# Patient Record
Sex: FEMALE | Race: WHITE | NOT HISPANIC OR LATINO | URBAN - METROPOLITAN AREA
[De-identification: names, ages, dates, MRNs, and addresses within clinical notes are randomized per-mention and may not be internally consistent; named-entity substitution may affect disease eponyms.]

---

## 2017-01-06 ENCOUNTER — APPOINTMENT (RX ONLY)
Dept: URBAN - METROPOLITAN AREA CLINIC 70 | Facility: CLINIC | Age: 27
Setting detail: DERMATOLOGY
End: 2017-01-06

## 2017-01-06 ENCOUNTER — APPOINTMENT (RX ONLY)
Dept: URBAN - METROPOLITAN AREA CLINIC 71 | Facility: CLINIC | Age: 27
Setting detail: DERMATOLOGY
End: 2017-01-06

## 2017-01-06 DIAGNOSIS — L40.0 PSORIASIS VULGARIS: ICD-10-CM

## 2017-01-06 DIAGNOSIS — L85.3 XEROSIS CUTIS: ICD-10-CM

## 2017-01-06 DIAGNOSIS — L65.9 NONSCARRING HAIR LOSS, UNSPECIFIED: ICD-10-CM

## 2017-01-06 DIAGNOSIS — L29.8 OTHER PRURITUS: ICD-10-CM

## 2017-01-06 DIAGNOSIS — L29.89 OTHER PRURITUS: ICD-10-CM

## 2017-01-06 PROBLEM — D48.5 NEOPLASM OF UNCERTAIN BEHAVIOR OF SKIN: Status: ACTIVE | Noted: 2017-01-06

## 2017-01-06 PROCEDURE — ? COUNSELING

## 2017-01-06 PROCEDURE — 99213 OFFICE O/P EST LOW 20 MIN: CPT

## 2017-01-06 PROCEDURE — ? TREATMENT REGIMEN

## 2017-01-06 ASSESSMENT — LOCATION SIMPLE DESCRIPTION DERM
LOCATION SIMPLE: HAIR
LOCATION SIMPLE: HAIR
LOCATION SIMPLE: SCALP
LOCATION SIMPLE: SCALP

## 2017-01-06 ASSESSMENT — LOCATION DETAILED DESCRIPTION DERM
LOCATION DETAILED: HAIR
LOCATION DETAILED: HAIR
LOCATION DETAILED: LEFT SUPERIOR PARIETAL SCALP
LOCATION DETAILED: LEFT SUPERIOR PARIETAL SCALP

## 2017-01-06 ASSESSMENT — LOCATION ZONE DERM
LOCATION ZONE: SCALP
LOCATION ZONE: SCALP

## 2017-01-06 NOTE — PROCEDURE: TREATMENT REGIMEN
Continue Regimen: Initiate PA for Xtract, restart treatments\\nTopical steroids\\n
Action 4: Continue
Detail Level: Simple
Plan: Reviewed etiology and treatment options. Difficult problem. Persistent scalp psoriasis. Pt has done well with Xtract and ILK in the past. Discussed ladder approach therapy as well. Await Xtract approval, restart treatments. Pt has done well with this in the past. Discussed ILK, I would hold on this for now. I would like to minimize potential for any steroid atrophy. I would expect Xtract approval within the week, from my standpoint we could get started back on treatments.
Plan: Due to inflammation from psoriasis. Start xtract for psoriasis treatment.

## 2017-01-06 NOTE — PROCEDURE: TREATMENT REGIMEN
Action 3: Continue
Plan: Due to inflammation from psoriasis. Start xtract for psoriasis treatment.
Plan: Reviewed etiology and treatment options. Difficult problem. Persistent scalp psoriasis. Pt has done well with Xtract and ILK in the past. Discussed ladder approach therapy as well. Await Xtract approval, restart treatments. Pt has done well with this in the past. Discussed ILK, I would hold on this for now. I would like to minimize potential for any steroid atrophy. I would expect Xtract approval within the week, from my standpoint we could get started back on treatments.
Detail Level: Simple
Continue Regimen: Initiate PA for Xtract, restart treatments\\nTopical steroids\\n

## 2017-03-08 ENCOUNTER — APPOINTMENT (RX ONLY)
Dept: URBAN - METROPOLITAN AREA CLINIC 70 | Facility: CLINIC | Age: 27
Setting detail: DERMATOLOGY
End: 2017-03-08

## 2017-03-08 ENCOUNTER — APPOINTMENT (RX ONLY)
Dept: URBAN - METROPOLITAN AREA CLINIC 71 | Facility: CLINIC | Age: 27
Setting detail: DERMATOLOGY
End: 2017-03-08

## 2017-03-08 DIAGNOSIS — L40.0 PSORIASIS VULGARIS: ICD-10-CM

## 2017-03-08 PROCEDURE — ? XTRAC LASER

## 2017-03-08 PROCEDURE — 96920 EXCIMER LSR PSRIASIS<250SQCM: CPT

## 2017-03-08 ASSESSMENT — LOCATION DETAILED DESCRIPTION DERM
LOCATION DETAILED: LEFT ELBOW
LOCATION DETAILED: LEFT ELBOW
LOCATION DETAILED: MID-OCCIPITAL SCALP
LOCATION DETAILED: LEFT SUPERIOR PARIETAL SCALP
LOCATION DETAILED: LEFT SUPERIOR PARIETAL SCALP
LOCATION DETAILED: MID-OCCIPITAL SCALP

## 2017-03-08 ASSESSMENT — LOCATION SIMPLE DESCRIPTION DERM
LOCATION SIMPLE: LEFT ELBOW
LOCATION SIMPLE: SCALP
LOCATION SIMPLE: POSTERIOR SCALP
LOCATION SIMPLE: SCALP
LOCATION SIMPLE: LEFT ELBOW
LOCATION SIMPLE: POSTERIOR SCALP

## 2017-03-08 ASSESSMENT — LOCATION ZONE DERM
LOCATION ZONE: ARM
LOCATION ZONE: ARM
LOCATION ZONE: SCALP
LOCATION ZONE: SCALP

## 2017-03-08 NOTE — PROCEDURE: XTRAC LASER
Location #1: Scalp
Consent: Written consent obtained, risks reviewed including but not limited to crusting, scabbing, blistering, scarring, darker or lighter pigmentary change, incidental hair removal, bruising, and/or incomplete removal.
Treatment Endpoint: no erythema
Post-Care Instructions: I reviewed with the patient in detail post-care instructions. Patient should stay away from the sun and wear sun protection until treated areas are fully healed.
Treatment Number: 1
Dose Setting #1 (Mj/Cm2): 400
Total Energy In Joules: 89.60
Detail Level: Detailed
Total Pulses (Will Not Render If 0): 0
Location #2: Elbow
Total Square Area In Cm2 (Required For Proper Billing): 224

## 2017-03-08 NOTE — PROCEDURE: XTRAC LASER
Dose Setting #1 (Mj/Cm2): 400
Detail Level: Detailed
Location #1: Scalp
Location #2: Elbow
Consent: Written consent obtained, risks reviewed including but not limited to crusting, scabbing, blistering, scarring, darker or lighter pigmentary change, incidental hair removal, bruising, and/or incomplete removal.
Post-Care Instructions: I reviewed with the patient in detail post-care instructions. Patient should stay away from the sun and wear sun protection until treated areas are fully healed.
Total Energy In Joules: 89.60
Treatment Endpoint: no erythema
Total Square Area In Cm2 (Required For Proper Billing): 224
Treatment Number: 1
Total Pulses (Will Not Render If 0): 0

## 2017-03-14 ENCOUNTER — APPOINTMENT (RX ONLY)
Dept: URBAN - METROPOLITAN AREA CLINIC 71 | Facility: CLINIC | Age: 27
Setting detail: DERMATOLOGY
End: 2017-03-14

## 2017-03-14 ENCOUNTER — APPOINTMENT (RX ONLY)
Dept: URBAN - METROPOLITAN AREA CLINIC 70 | Facility: CLINIC | Age: 27
Setting detail: DERMATOLOGY
End: 2017-03-14

## 2017-03-14 DIAGNOSIS — L40.0 PSORIASIS VULGARIS: ICD-10-CM

## 2017-03-14 PROBLEM — D48.5 NEOPLASM OF UNCERTAIN BEHAVIOR OF SKIN: Status: ACTIVE | Noted: 2017-03-14

## 2017-03-14 PROCEDURE — ? XTRAC LASER

## 2017-03-14 PROCEDURE — 96920 EXCIMER LSR PSRIASIS<250SQCM: CPT

## 2017-03-14 PROCEDURE — 99211 OFF/OP EST MAY X REQ PHY/QHP: CPT | Mod: 25

## 2017-03-14 ASSESSMENT — LOCATION DETAILED DESCRIPTION DERM
LOCATION DETAILED: LEFT ELBOW
LOCATION DETAILED: LEFT SUPERIOR PARIETAL SCALP
LOCATION DETAILED: LEFT ELBOW
LOCATION DETAILED: MID-OCCIPITAL SCALP
LOCATION DETAILED: LEFT SUPERIOR PARIETAL SCALP
LOCATION DETAILED: MID-OCCIPITAL SCALP

## 2017-03-14 ASSESSMENT — LOCATION SIMPLE DESCRIPTION DERM
LOCATION SIMPLE: SCALP
LOCATION SIMPLE: LEFT ELBOW
LOCATION SIMPLE: LEFT ELBOW
LOCATION SIMPLE: POSTERIOR SCALP
LOCATION SIMPLE: SCALP
LOCATION SIMPLE: POSTERIOR SCALP

## 2017-03-14 ASSESSMENT — LOCATION ZONE DERM
LOCATION ZONE: SCALP
LOCATION ZONE: ARM
LOCATION ZONE: SCALP
LOCATION ZONE: ARM

## 2017-03-14 NOTE — PROCEDURE: XTRAC LASER
Dose Setting #1 (Mj/Cm2): 420
Total Pulses (Will Not Render If 0): 0
Consent: Written consent obtained, risks reviewed including but not limited to crusting, scabbing, blistering, scarring, darker or lighter pigmentary change, incidental hair removal, bruising, and/or incomplete removal.
Location #1: Scalp
Detail Level: Detailed
Treatment Number: 2
Post-Care Instructions: I reviewed with the patient in detail post-care instructions. Patient should stay away from the sun and wear sun protection until treated areas are fully healed.
Total Square Area In Cm2 (Required For Proper Billing): 228
Total Energy In Joules: 95.76
Treatment Endpoint: no erythema
Location #2: Elbow
Comments: Increased by 5%

## 2017-03-14 NOTE — PROCEDURE: XTRAC LASER
Detail Level: Detailed
Consent: Written consent obtained, risks reviewed including but not limited to crusting, scabbing, blistering, scarring, darker or lighter pigmentary change, incidental hair removal, bruising, and/or incomplete removal.
Dose Setting #1 (Mj/Cm2): 420
Location #1: Scalp
Comments: Increased by 5%
Post-Care Instructions: I reviewed with the patient in detail post-care instructions. Patient should stay away from the sun and wear sun protection until treated areas are fully healed.
Total Energy In Joules: 95.76
Treatment Endpoint: no erythema
Total Square Area In Cm2 (Required For Proper Billing): 228
Location #2: Elbow
Total Pulses (Will Not Render If 0): 0
Treatment Number: 2

## 2017-03-17 ENCOUNTER — APPOINTMENT (RX ONLY)
Dept: URBAN - METROPOLITAN AREA CLINIC 71 | Facility: CLINIC | Age: 27
Setting detail: DERMATOLOGY
End: 2017-03-17

## 2017-03-17 ENCOUNTER — APPOINTMENT (RX ONLY)
Dept: URBAN - METROPOLITAN AREA CLINIC 70 | Facility: CLINIC | Age: 27
Setting detail: DERMATOLOGY
End: 2017-03-17

## 2017-03-17 DIAGNOSIS — L40.0 PSORIASIS VULGARIS: ICD-10-CM

## 2017-03-17 PROBLEM — D48.5 NEOPLASM OF UNCERTAIN BEHAVIOR OF SKIN: Status: ACTIVE | Noted: 2017-03-17

## 2017-03-17 PROCEDURE — 96920 EXCIMER LSR PSRIASIS<250SQCM: CPT

## 2017-03-17 PROCEDURE — ? XTRAC LASER

## 2017-03-17 ASSESSMENT — LOCATION SIMPLE DESCRIPTION DERM
LOCATION SIMPLE: SCALP
LOCATION SIMPLE: LEFT ELBOW
LOCATION SIMPLE: POSTERIOR SCALP
LOCATION SIMPLE: LEFT ELBOW
LOCATION SIMPLE: POSTERIOR SCALP
LOCATION SIMPLE: SCALP

## 2017-03-17 ASSESSMENT — LOCATION ZONE DERM
LOCATION ZONE: ARM
LOCATION ZONE: SCALP
LOCATION ZONE: SCALP
LOCATION ZONE: ARM

## 2017-03-17 NOTE — PROCEDURE: XTRAC LASER
Location #2: Elbow
Treatment Endpoint: no erythema
Dose Setting #2 (Mj/Cm2): 483
Consent: Written consent obtained, risks reviewed including but not limited to crusting, scabbing, blistering, scarring, darker or lighter pigmentary change, incidental hair removal, bruising, and/or incomplete removal.
Treatment Number: 3
Total Pulses (Will Not Render If 0): 0
Total Energy In Joules: 79.21
Post-Care Instructions: I reviewed with the patient in detail post-care instructions. Patient should stay away from the sun and wear sun protection until treated areas are fully healed.
Comments: Increased pt by 15%. MA
Detail Level: Detailed
Location #1: Scalp
Total Square Area In Cm2 (Required For Proper Billing): 164

## 2017-03-27 ENCOUNTER — APPOINTMENT (RX ONLY)
Dept: URBAN - METROPOLITAN AREA CLINIC 70 | Facility: CLINIC | Age: 27
Setting detail: DERMATOLOGY
End: 2017-03-27

## 2017-03-27 ENCOUNTER — APPOINTMENT (RX ONLY)
Dept: URBAN - METROPOLITAN AREA CLINIC 71 | Facility: CLINIC | Age: 27
Setting detail: DERMATOLOGY
End: 2017-03-27

## 2017-03-27 DIAGNOSIS — L40.0 PSORIASIS VULGARIS: ICD-10-CM

## 2017-03-27 PROBLEM — D48.5 NEOPLASM OF UNCERTAIN BEHAVIOR OF SKIN: Status: ACTIVE | Noted: 2017-03-27

## 2017-03-27 PROCEDURE — ? XTRAC LASER

## 2017-03-27 PROCEDURE — 99211 OFF/OP EST MAY X REQ PHY/QHP: CPT | Mod: 25

## 2017-03-27 PROCEDURE — 96920 EXCIMER LSR PSRIASIS<250SQCM: CPT

## 2017-03-27 ASSESSMENT — LOCATION SIMPLE DESCRIPTION DERM
LOCATION SIMPLE: SCALP
LOCATION SIMPLE: SCALP
LOCATION SIMPLE: POSTERIOR SCALP
LOCATION SIMPLE: LEFT ELBOW
LOCATION SIMPLE: LEFT ELBOW
LOCATION SIMPLE: POSTERIOR SCALP

## 2017-03-27 ASSESSMENT — LOCATION DETAILED DESCRIPTION DERM
LOCATION DETAILED: LEFT SUPERIOR PARIETAL SCALP
LOCATION DETAILED: LEFT ELBOW
LOCATION DETAILED: MID-OCCIPITAL SCALP
LOCATION DETAILED: LEFT SUPERIOR PARIETAL SCALP
LOCATION DETAILED: MID-OCCIPITAL SCALP
LOCATION DETAILED: LEFT ELBOW

## 2017-03-27 ASSESSMENT — LOCATION ZONE DERM
LOCATION ZONE: ARM
LOCATION ZONE: SCALP
LOCATION ZONE: SCALP
LOCATION ZONE: ARM

## 2017-03-27 NOTE — PROCEDURE: XTRAC LASER
Location #1: Scalp
Post-Care Instructions: I reviewed with the patient in detail post-care instructions. Patient should stay away from the sun and wear sun protection until treated areas are fully healed.
Total Energy In Joules: 77.06
Treatment Endpoint: no erythema
Dose Setting #2 (Mj/Cm2): 500
Consent: Written consent obtained, risks reviewed including but not limited to crusting, scabbing, blistering, scarring, darker or lighter pigmentary change, incidental hair removal, bruising, and/or incomplete removal.
Comments: Increased pt by 5%. MA
Treatment Number: 4
Location #2: Elbow
Detail Level: Detailed
Total Square Area In Cm2 (Required For Proper Billing): 152
Total Pulses (Will Not Render If 0): 0

## 2017-03-27 NOTE — PROCEDURE: XTRAC LASER
Total Pulses (Will Not Render If 0): 0
Treatment Number: 4
Location #2: Elbow
Comments: Increased pt by 5%. MA
Location #1: Scalp
Dose Setting #2 (Mj/Cm2): 500
Post-Care Instructions: I reviewed with the patient in detail post-care instructions. Patient should stay away from the sun and wear sun protection until treated areas are fully healed.
Detail Level: Detailed
Treatment Endpoint: no erythema
Consent: Written consent obtained, risks reviewed including but not limited to crusting, scabbing, blistering, scarring, darker or lighter pigmentary change, incidental hair removal, bruising, and/or incomplete removal.
Total Energy In Joules: 77.06
Total Square Area In Cm2 (Required For Proper Billing): 152

## 2017-03-30 ENCOUNTER — APPOINTMENT (RX ONLY)
Dept: URBAN - METROPOLITAN AREA CLINIC 71 | Facility: CLINIC | Age: 27
Setting detail: DERMATOLOGY
End: 2017-03-30

## 2017-03-30 ENCOUNTER — APPOINTMENT (RX ONLY)
Dept: URBAN - METROPOLITAN AREA CLINIC 70 | Facility: CLINIC | Age: 27
Setting detail: DERMATOLOGY
End: 2017-03-30

## 2017-03-30 DIAGNOSIS — L40.0 PSORIASIS VULGARIS: ICD-10-CM

## 2017-03-30 PROBLEM — D48.5 NEOPLASM OF UNCERTAIN BEHAVIOR OF SKIN: Status: ACTIVE | Noted: 2017-03-30

## 2017-03-30 PROCEDURE — ? XTRAC LASER

## 2017-03-30 PROCEDURE — 96920 EXCIMER LSR PSRIASIS<250SQCM: CPT

## 2017-03-30 PROCEDURE — 99211 OFF/OP EST MAY X REQ PHY/QHP: CPT | Mod: 25

## 2017-03-30 ASSESSMENT — LOCATION DETAILED DESCRIPTION DERM
LOCATION DETAILED: MID-OCCIPITAL SCALP
LOCATION DETAILED: MID-OCCIPITAL SCALP
LOCATION DETAILED: LEFT ELBOW
LOCATION DETAILED: LEFT ELBOW
LOCATION DETAILED: LEFT SUPERIOR PARIETAL SCALP
LOCATION DETAILED: LEFT SUPERIOR PARIETAL SCALP

## 2017-03-30 ASSESSMENT — LOCATION SIMPLE DESCRIPTION DERM
LOCATION SIMPLE: SCALP
LOCATION SIMPLE: LEFT ELBOW
LOCATION SIMPLE: POSTERIOR SCALP
LOCATION SIMPLE: SCALP
LOCATION SIMPLE: POSTERIOR SCALP
LOCATION SIMPLE: LEFT ELBOW

## 2017-03-30 ASSESSMENT — LOCATION ZONE DERM
LOCATION ZONE: ARM
LOCATION ZONE: SCALP
LOCATION ZONE: SCALP
LOCATION ZONE: ARM

## 2017-03-30 NOTE — PROCEDURE: XTRAC LASER
Total Energy In Joules: 109.37
Total Square Area In Cm2 (Required For Proper Billing): 196
Location #1: Scalp
Location #2: Elbow
Detail Level: Detailed
Consent: Written consent obtained, risks reviewed including but not limited to crusting, scabbing, blistering, scarring, darker or lighter pigmentary change, incidental hair removal, bruising, and/or incomplete removal.
Comments: Increased pt by 5%. MA
Total Pulses (Will Not Render If 0): 0
Dose Setting #2 (Mj/Cm2): 880
Post-Care Instructions: I reviewed with the patient in detail post-care instructions. Patient should stay away from the sun and wear sun protection until treated areas are fully healed.
Treatment Number: 5
Treatment Endpoint: no erythema

## 2017-03-30 NOTE — PROCEDURE: XTRAC LASER
Dose Setting #2 (Mj/Cm2): 936
Treatment Number: 5
Total Pulses (Will Not Render If 0): 0
Consent: Written consent obtained, risks reviewed including but not limited to crusting, scabbing, blistering, scarring, darker or lighter pigmentary change, incidental hair removal, bruising, and/or incomplete removal.
Treatment Endpoint: no erythema
Post-Care Instructions: I reviewed with the patient in detail post-care instructions. Patient should stay away from the sun and wear sun protection until treated areas are fully healed.
Comments: Increased pt by 5%. MA
Detail Level: Detailed
Total Energy In Joules: 109.37
Location #1: Scalp
Total Square Area In Cm2 (Required For Proper Billing): 196
Location #2: Elbow

## 2017-04-03 ENCOUNTER — APPOINTMENT (RX ONLY)
Dept: URBAN - METROPOLITAN AREA CLINIC 70 | Facility: CLINIC | Age: 27
Setting detail: DERMATOLOGY
End: 2017-04-03

## 2017-04-03 ENCOUNTER — APPOINTMENT (RX ONLY)
Dept: URBAN - METROPOLITAN AREA CLINIC 71 | Facility: CLINIC | Age: 27
Setting detail: DERMATOLOGY
End: 2017-04-03

## 2017-04-03 DIAGNOSIS — L40.0 PSORIASIS VULGARIS: ICD-10-CM

## 2017-04-03 PROBLEM — L29.8 OTHER PRURITUS: Status: ACTIVE | Noted: 2017-04-03

## 2017-04-03 PROCEDURE — 96920 EXCIMER LSR PSRIASIS<250SQCM: CPT

## 2017-04-03 PROCEDURE — ? XTRAC LASER

## 2017-04-03 PROCEDURE — 99211 OFF/OP EST MAY X REQ PHY/QHP: CPT | Mod: 25

## 2017-04-03 ASSESSMENT — LOCATION DETAILED DESCRIPTION DERM
LOCATION DETAILED: LEFT OCCIPITAL SCALP
LOCATION DETAILED: LEFT ELBOW
LOCATION DETAILED: LEFT ELBOW
LOCATION DETAILED: LEFT OCCIPITAL SCALP

## 2017-04-03 ASSESSMENT — LOCATION SIMPLE DESCRIPTION DERM
LOCATION SIMPLE: LEFT ELBOW
LOCATION SIMPLE: POSTERIOR SCALP
LOCATION SIMPLE: POSTERIOR SCALP
LOCATION SIMPLE: LEFT ELBOW

## 2017-04-03 ASSESSMENT — LOCATION ZONE DERM
LOCATION ZONE: SCALP
LOCATION ZONE: SCALP
LOCATION ZONE: ARM
LOCATION ZONE: ARM

## 2017-04-03 NOTE — PROCEDURE: XTRAC LASER
Post-Care Instructions: I reviewed with the patient in detail post-care instructions. Patient should stay away from the sun and wear sun protection until treated areas are fully healed.
Total Energy In Joules: 115.38
Dose Setting #1 (Mj/Cm2): 645
Total Square Area In Cm2 (Required For Proper Billing): 180
Total Pulses (Will Not Render If 0): 0
Treatment Endpoint: no erythema
Location #2: Elbow
Consent: Written consent obtained, risks reviewed including but not limited to crusting, scabbing, blistering, scarring, darker or lighter pigmentary change, incidental hair removal, bruising, and/or incomplete removal.
Treatment Number: 6
Detail Level: Zone
Location #1: Scalp

## 2017-04-03 NOTE — PROCEDURE: XTRAC LASER
Post-Care Instructions: I reviewed with the patient in detail post-care instructions. Patient should stay away from the sun and wear sun protection until treated areas are fully healed.
Total Energy In Joules: 115.38
Total Pulses (Will Not Render If 0): 0
Dose Setting #1 (Mj/Cm2): 642
Total Square Area In Cm2 (Required For Proper Billing): 180
Location #2: Elbow
Treatment Number: 6
Consent: Written consent obtained, risks reviewed including but not limited to crusting, scabbing, blistering, scarring, darker or lighter pigmentary change, incidental hair removal, bruising, and/or incomplete removal.
Location #1: Scalp
Treatment Endpoint: no erythema
Detail Level: Zone

## 2017-04-07 ENCOUNTER — APPOINTMENT (RX ONLY)
Dept: URBAN - METROPOLITAN AREA CLINIC 70 | Facility: CLINIC | Age: 27
Setting detail: DERMATOLOGY
End: 2017-04-07

## 2017-04-07 ENCOUNTER — APPOINTMENT (RX ONLY)
Dept: URBAN - METROPOLITAN AREA CLINIC 71 | Facility: CLINIC | Age: 27
Setting detail: DERMATOLOGY
End: 2017-04-07

## 2017-04-07 DIAGNOSIS — L40.0 PSORIASIS VULGARIS: ICD-10-CM

## 2017-04-07 PROBLEM — D48.5 NEOPLASM OF UNCERTAIN BEHAVIOR OF SKIN: Status: ACTIVE | Noted: 2017-04-07

## 2017-04-07 PROCEDURE — 96920 EXCIMER LSR PSRIASIS<250SQCM: CPT

## 2017-04-07 PROCEDURE — ? XTRAC LASER

## 2017-04-07 ASSESSMENT — LOCATION SIMPLE DESCRIPTION DERM
LOCATION SIMPLE: POSTERIOR SCALP
LOCATION SIMPLE: POSTERIOR SCALP
LOCATION SIMPLE: LEFT ELBOW
LOCATION SIMPLE: LEFT ELBOW

## 2017-04-07 ASSESSMENT — LOCATION DETAILED DESCRIPTION DERM
LOCATION DETAILED: LEFT OCCIPITAL SCALP
LOCATION DETAILED: LEFT OCCIPITAL SCALP
LOCATION DETAILED: LEFT ELBOW
LOCATION DETAILED: LEFT ELBOW

## 2017-04-07 ASSESSMENT — LOCATION ZONE DERM
LOCATION ZONE: ARM
LOCATION ZONE: SCALP
LOCATION ZONE: ARM
LOCATION ZONE: SCALP

## 2017-04-07 NOTE — PROCEDURE: XTRAC LASER
Dose Setting #1 (Mj/Cm2): 737
Treatment Number: 6
Total Energy In Joules: 173.93
Detail Level: Simple
Total Pulses (Will Not Render If 0): 0
Consent: Written consent obtained, risks reviewed including but not limited to crusting, scabbing, blistering, scarring, darker or lighter pigmentary change, incidental hair removal, bruising, and/or incomplete removal.
Post-Care Instructions: I reviewed with the patient in detail post-care instructions. Patient should stay away from the sun and wear sun protection until treated areas are fully healed.
Location #1: Scalp
Treatment Endpoint: no erythema
Location #2: Elbow
Total Square Area In Cm2 (Required For Proper Billing): 236

## 2017-04-07 NOTE — PROCEDURE: XTRAC LASER
Treatment Endpoint: no erythema
Detail Level: Simple
Dose Setting #1 (Mj/Cm2): 737
Consent: Written consent obtained, risks reviewed including but not limited to crusting, scabbing, blistering, scarring, darker or lighter pigmentary change, incidental hair removal, bruising, and/or incomplete removal.
Treatment Number: 6
Location #2: Elbow
Post-Care Instructions: I reviewed with the patient in detail post-care instructions. Patient should stay away from the sun and wear sun protection until treated areas are fully healed.
Total Square Area In Cm2 (Required For Proper Billing): 236
Location #1: Scalp
Total Energy In Joules: 173.93
Total Pulses (Will Not Render If 0): 0

## 2017-04-14 ENCOUNTER — APPOINTMENT (RX ONLY)
Dept: URBAN - METROPOLITAN AREA CLINIC 70 | Facility: CLINIC | Age: 27
Setting detail: DERMATOLOGY
End: 2017-04-14

## 2017-04-14 ENCOUNTER — APPOINTMENT (RX ONLY)
Dept: URBAN - METROPOLITAN AREA CLINIC 71 | Facility: CLINIC | Age: 27
Setting detail: DERMATOLOGY
End: 2017-04-14

## 2017-04-14 DIAGNOSIS — L40.0 PSORIASIS VULGARIS: ICD-10-CM

## 2017-04-14 PROCEDURE — 99211 OFF/OP EST MAY X REQ PHY/QHP: CPT | Mod: 25

## 2017-04-14 PROCEDURE — 96920 EXCIMER LSR PSRIASIS<250SQCM: CPT

## 2017-04-14 PROCEDURE — ? XTRAC LASER

## 2017-04-14 ASSESSMENT — LOCATION ZONE DERM
LOCATION ZONE: SCALP
LOCATION ZONE: ARM
LOCATION ZONE: ARM
LOCATION ZONE: SCALP

## 2017-04-14 NOTE — PROCEDURE: XTRAC LASER
Location #2: Elbow
Dose Setting #2 (Mj/Cm2): 0
Consent: Written consent obtained, risks reviewed including but not limited to crusting, scabbing, blistering, scarring, darker or lighter pigmentary change, incidental hair removal, bruising, and/or incomplete removal.
Treatment Number: 8
Total Energy In Joules: 126.76
Detail Level: Simple
Post-Care Instructions: I reviewed with the patient in detail post-care instructions. Patient should stay away from the sun and wear sun protection until treated areas are fully healed.
Treatment Endpoint: no erythema
Comments: Pt had engagement photos and did not want to be red, so declined doing elbow. Maintained dose for scalp./my
Location #1: Scalp
Dose Setting #1 (Mj/Cm2): 737
Total Square Area In Cm2 (Required For Proper Billing): 172

## 2017-04-14 NOTE — PROCEDURE: XTRAC LASER
Treatment Endpoint: no erythema
Treatment Number: 8
Location #1: Scalp
Total Energy In Joules: 126.76
Total Square Area In Cm2 (Required For Proper Billing): 172
Dose Setting #1 (Mj/Cm2): 737
Dose Setting #2 (Mj/Cm2): 0
Detail Level: Simple
Post-Care Instructions: I reviewed with the patient in detail post-care instructions. Patient should stay away from the sun and wear sun protection until treated areas are fully healed.
Location #2: Elbow
Comments: Pt had engagement photos and did not want to be red, so declined doing elbow. Maintained dose for scalp./my
Consent: Written consent obtained, risks reviewed including but not limited to crusting, scabbing, blistering, scarring, darker or lighter pigmentary change, incidental hair removal, bruising, and/or incomplete removal.

## 2017-04-17 ENCOUNTER — APPOINTMENT (RX ONLY)
Dept: URBAN - METROPOLITAN AREA CLINIC 71 | Facility: CLINIC | Age: 27
Setting detail: DERMATOLOGY
End: 2017-04-17

## 2017-04-17 ENCOUNTER — APPOINTMENT (RX ONLY)
Dept: URBAN - METROPOLITAN AREA CLINIC 70 | Facility: CLINIC | Age: 27
Setting detail: DERMATOLOGY
End: 2017-04-17

## 2017-04-17 DIAGNOSIS — L40.0 PSORIASIS VULGARIS: ICD-10-CM

## 2017-04-17 PROBLEM — L29.8 OTHER PRURITUS: Status: ACTIVE | Noted: 2017-04-17

## 2017-04-17 PROCEDURE — 96920 EXCIMER LSR PSRIASIS<250SQCM: CPT

## 2017-04-17 PROCEDURE — 99211 OFF/OP EST MAY X REQ PHY/QHP: CPT | Mod: 25

## 2017-04-17 PROCEDURE — ? XTRAC LASER

## 2017-04-17 ASSESSMENT — LOCATION ZONE DERM
LOCATION ZONE: ARM
LOCATION ZONE: SCALP
LOCATION ZONE: ARM
LOCATION ZONE: SCALP

## 2017-04-17 ASSESSMENT — LOCATION DETAILED DESCRIPTION DERM
LOCATION DETAILED: LEFT ELBOW
LOCATION DETAILED: LEFT OCCIPITAL SCALP
LOCATION DETAILED: LEFT OCCIPITAL SCALP
LOCATION DETAILED: LEFT ELBOW

## 2017-04-17 ASSESSMENT — LOCATION SIMPLE DESCRIPTION DERM
LOCATION SIMPLE: LEFT ELBOW
LOCATION SIMPLE: LEFT ELBOW
LOCATION SIMPLE: POSTERIOR SCALP
LOCATION SIMPLE: POSTERIOR SCALP

## 2017-04-17 NOTE — PROCEDURE: XTRAC LASER
Location #1: Scalp
Consent: Written consent obtained, risks reviewed including but not limited to crusting, scabbing, blistering, scarring, darker or lighter pigmentary change, incidental hair removal, bruising, and/or incomplete removal.
Detail Level: Simple
Total Square Area In Cm2 (Required For Proper Billing): 220
Treatment Endpoint: no erythema
Total Energy In Joules: 160.81
Treatment Number: 9
Location #2: Elbow
Dose Setting #1 (Mj/Cm2): 737
Total Pulses (Will Not Render If 0): 0
Dose Setting #2 (Mj/Cm2): 725
Post-Care Instructions: I reviewed with the patient in detail post-care instructions. Patient should stay away from the sun and wear sun protection until treated areas are fully healed.
Comments: Pt requested to treat elbow today, but with a decrease of 15 %. Maintained dose on scalp.RIGO
% Increase/Decrease From Last Treatment: Decreased by 15% on elbow only, per pt request.DP

## 2017-04-17 NOTE — PROCEDURE: XTRAC LASER
Total Square Area In Cm2 (Required For Proper Billing): 220
Total Energy In Joules: 160.81
Location #2: Elbow
Dose Setting #2 (Mj/Cm2): 063
Comments: Pt requested to treat elbow today, but with a decrease of 15 %. Maintained dose on scalp.ISSA
Total Pulses (Will Not Render If 0): 0
Consent: Written consent obtained, risks reviewed including but not limited to crusting, scabbing, blistering, scarring, darker or lighter pigmentary change, incidental hair removal, bruising, and/or incomplete removal.
% Increase/Decrease From Last Treatment: Decreased by 15% on elbow only, per pt request.DP
Post-Care Instructions: I reviewed with the patient in detail post-care instructions. Patient should stay away from the sun and wear sun protection until treated areas are fully healed.
Detail Level: Simple
Treatment Endpoint: no erythema
Dose Setting #1 (Mj/Cm2): 737
Location #1: Scalp
Treatment Number: 9

## 2017-04-20 ENCOUNTER — APPOINTMENT (RX ONLY)
Dept: URBAN - METROPOLITAN AREA CLINIC 70 | Facility: CLINIC | Age: 27
Setting detail: DERMATOLOGY
End: 2017-04-20

## 2017-04-20 ENCOUNTER — APPOINTMENT (RX ONLY)
Dept: URBAN - METROPOLITAN AREA CLINIC 71 | Facility: CLINIC | Age: 27
Setting detail: DERMATOLOGY
End: 2017-04-20

## 2017-04-20 DIAGNOSIS — L40.0 PSORIASIS VULGARIS: ICD-10-CM

## 2017-04-20 PROCEDURE — 99211 OFF/OP EST MAY X REQ PHY/QHP: CPT | Mod: 25

## 2017-04-20 PROCEDURE — ? XTRAC LASER

## 2017-04-20 PROCEDURE — 96920 EXCIMER LSR PSRIASIS<250SQCM: CPT

## 2017-04-20 ASSESSMENT — LOCATION DETAILED DESCRIPTION DERM
LOCATION DETAILED: LEFT ELBOW
LOCATION DETAILED: LEFT ELBOW
LOCATION DETAILED: LEFT OCCIPITAL SCALP
LOCATION DETAILED: LEFT OCCIPITAL SCALP

## 2017-04-20 ASSESSMENT — LOCATION ZONE DERM
LOCATION ZONE: SCALP
LOCATION ZONE: SCALP
LOCATION ZONE: ARM
LOCATION ZONE: ARM

## 2017-04-20 NOTE — PROCEDURE: XTRAC LASER
Treatment Number: 10
Dose Setting #1 (Mj/Cm2): 737
Total Pulses (Will Not Render If 0): 0
Comments: Pt declined to treat elbow today.pineda
Detail Level: Simple
Location #1: Scalp
Total Energy In Joules: 85.49
Treatment Endpoint: no erythema
Total Square Area In Cm2 (Required For Proper Billing): 116
Location #2: Elbow
% Increase/Decrease From Last Treatment: Maintained  dose on scalp per patient request .pineda
Consent: Written consent obtained, risks reviewed including but not limited to crusting, scabbing, blistering, scarring, darker or lighter pigmentary change, incidental hair removal, bruising, and/or incomplete removal.
Post-Care Instructions: I reviewed with the patient in detail post-care instructions. Patient should stay away from the sun and wear sun protection until treated areas are fully healed.

## 2017-04-20 NOTE — PROCEDURE: XTRAC LASER
Total Square Area In Cm2 (Required For Proper Billing): 116
Consent: Written consent obtained, risks reviewed including but not limited to crusting, scabbing, blistering, scarring, darker or lighter pigmentary change, incidental hair removal, bruising, and/or incomplete removal.
Treatment Number: 10
Post-Care Instructions: I reviewed with the patient in detail post-care instructions. Patient should stay away from the sun and wear sun protection until treated areas are fully healed.
Dose Setting #1 (Mj/Cm2): 737
Total Pulses (Will Not Render If 0): 0
Total Energy In Joules: 85.49
Detail Level: Simple
% Increase/Decrease From Last Treatment: Maintained  dose on scalp per patient request .sofiya
Treatment Endpoint: no erythema
Location #2: Elbow
Comments: Pt declined to treat elbow today.sofiya
Location #1: Scalp

## 2017-04-24 ENCOUNTER — APPOINTMENT (RX ONLY)
Dept: URBAN - METROPOLITAN AREA CLINIC 70 | Facility: CLINIC | Age: 27
Setting detail: DERMATOLOGY
End: 2017-04-24

## 2017-04-24 ENCOUNTER — APPOINTMENT (RX ONLY)
Dept: URBAN - METROPOLITAN AREA CLINIC 71 | Facility: CLINIC | Age: 27
Setting detail: DERMATOLOGY
End: 2017-04-24

## 2017-04-24 DIAGNOSIS — L40.0 PSORIASIS VULGARIS: ICD-10-CM

## 2017-04-24 PROBLEM — D48.5 NEOPLASM OF UNCERTAIN BEHAVIOR OF SKIN: Status: ACTIVE | Noted: 2017-04-24

## 2017-04-24 PROBLEM — L29.8 OTHER PRURITUS: Status: ACTIVE | Noted: 2017-04-24

## 2017-04-24 PROCEDURE — 96920 EXCIMER LSR PSRIASIS<250SQCM: CPT

## 2017-04-24 PROCEDURE — 99211 OFF/OP EST MAY X REQ PHY/QHP: CPT | Mod: 25

## 2017-04-24 PROCEDURE — ? XTRAC LASER

## 2017-04-24 ASSESSMENT — LOCATION SIMPLE DESCRIPTION DERM
LOCATION SIMPLE: POSTERIOR SCALP
LOCATION SIMPLE: LEFT ELBOW
LOCATION SIMPLE: POSTERIOR SCALP
LOCATION SIMPLE: LEFT ELBOW

## 2017-04-24 ASSESSMENT — LOCATION DETAILED DESCRIPTION DERM
LOCATION DETAILED: LEFT ELBOW
LOCATION DETAILED: LEFT OCCIPITAL SCALP
LOCATION DETAILED: LEFT ELBOW
LOCATION DETAILED: LEFT OCCIPITAL SCALP

## 2017-04-24 ASSESSMENT — LOCATION ZONE DERM
LOCATION ZONE: ARM
LOCATION ZONE: SCALP
LOCATION ZONE: SCALP
LOCATION ZONE: ARM

## 2017-04-24 NOTE — PROCEDURE: XTRAC LASER
Treatment Number: 11
Post-Care Instructions: I reviewed with the patient in detail post-care instructions. Patient should stay away from the sun and wear sun protection until treated areas are fully healed.
Dose Setting #1 (Mj/Cm2): 811
Detail Level: Simple
Location #2: Elbow
Total Pulses (Will Not Render If 0): 0
Location #1: Scalp
Total Square Area In Cm2 (Required For Proper Billing): 188
Consent: Written consent obtained, risks reviewed including but not limited to crusting, scabbing, blistering, scarring, darker or lighter pigmentary change, incidental hair removal, bruising, and/or incomplete removal.
Total Energy In Joules: 150.48
Comments: increased by 10% on scalp\\ndecreased by 10% on elbow.
Treatment Endpoint: no erythema
Dose Setting #2 (Mj/Cm2): 563

## 2017-04-28 ENCOUNTER — APPOINTMENT (RX ONLY)
Dept: URBAN - METROPOLITAN AREA CLINIC 70 | Facility: CLINIC | Age: 27
Setting detail: DERMATOLOGY
End: 2017-04-28

## 2017-04-28 ENCOUNTER — APPOINTMENT (RX ONLY)
Dept: URBAN - METROPOLITAN AREA CLINIC 71 | Facility: CLINIC | Age: 27
Setting detail: DERMATOLOGY
End: 2017-04-28

## 2017-04-28 DIAGNOSIS — Z71.89 OTHER SPECIFIED COUNSELING: ICD-10-CM

## 2017-04-28 DIAGNOSIS — L40.0 PSORIASIS VULGARIS: ICD-10-CM | Status: IMPROVED

## 2017-04-28 DIAGNOSIS — L40.0 PSORIASIS VULGARIS: ICD-10-CM

## 2017-04-28 PROCEDURE — 96920 EXCIMER LSR PSRIASIS<250SQCM: CPT

## 2017-04-28 PROCEDURE — 11900 INJECT SKIN LESIONS </W 7: CPT

## 2017-04-28 PROCEDURE — ? INTRALESIONAL KENALOG

## 2017-04-28 PROCEDURE — ? PRESCRIPTION

## 2017-04-28 PROCEDURE — ? XTRAC LASER

## 2017-04-28 PROCEDURE — ? TREATMENT REGIMEN

## 2017-04-28 PROCEDURE — ? COUNSELING

## 2017-04-28 PROCEDURE — 99213 OFFICE O/P EST LOW 20 MIN: CPT | Mod: 25

## 2017-04-28 RX ORDER — CLOBETASOL PROPIONATE 0.05 G/ML
SPRAY TOPICAL
Qty: 1 | Refills: 3 | Status: ERX

## 2017-04-28 RX ORDER — CALCIPOTRIENE AND BETAMETHASONE DIPROPIONATE 50; .5 UG/G; MG/G
SUSPENSION TOPICAL
Qty: 1 | Refills: 3 | Status: ERX

## 2017-04-28 ASSESSMENT — LOCATION DETAILED DESCRIPTION DERM
LOCATION DETAILED: RIGHT INFERIOR OCCIPITAL SCALP
LOCATION DETAILED: LEFT SUPERIOR PARIETAL SCALP
LOCATION DETAILED: LEFT ELBOW
LOCATION DETAILED: LEFT SUPERIOR OCCIPITAL SCALP
LOCATION DETAILED: LEFT ELBOW
LOCATION DETAILED: LEFT SUPERIOR OCCIPITAL SCALP
LOCATION DETAILED: LEFT CENTRAL PARIETAL SCALP
LOCATION DETAILED: LEFT INFERIOR OCCIPITAL SCALP
LOCATION DETAILED: LEFT CENTRAL PARIETAL SCALP
LOCATION DETAILED: LEFT SUPERIOR PARIETAL SCALP
LOCATION DETAILED: RIGHT INFERIOR OCCIPITAL SCALP
LOCATION DETAILED: LEFT OCCIPITAL SCALP
LOCATION DETAILED: LEFT OCCIPITAL SCALP
LOCATION DETAILED: RIGHT CENTRAL PARIETAL SCALP
LOCATION DETAILED: LEFT ELBOW
LOCATION DETAILED: LEFT INFERIOR OCCIPITAL SCALP
LOCATION DETAILED: RIGHT CENTRAL PARIETAL SCALP
LOCATION DETAILED: MID-OCCIPITAL SCALP
LOCATION DETAILED: LEFT ELBOW
LOCATION DETAILED: MID-OCCIPITAL SCALP

## 2017-04-28 ASSESSMENT — LOCATION SIMPLE DESCRIPTION DERM
LOCATION SIMPLE: POSTERIOR SCALP
LOCATION SIMPLE: SCALP
LOCATION SIMPLE: LEFT ELBOW
LOCATION SIMPLE: LEFT OCCIPITAL SCALP
LOCATION SIMPLE: POSTERIOR SCALP
LOCATION SIMPLE: SCALP
LOCATION SIMPLE: POSTERIOR SCALP
LOCATION SIMPLE: LEFT ELBOW
LOCATION SIMPLE: LEFT OCCIPITAL SCALP
LOCATION SIMPLE: LEFT ELBOW
LOCATION SIMPLE: POSTERIOR SCALP
LOCATION SIMPLE: LEFT ELBOW

## 2017-04-28 ASSESSMENT — LOCATION ZONE DERM
LOCATION ZONE: SCALP
LOCATION ZONE: SCALP
LOCATION ZONE: ARM
LOCATION ZONE: SCALP
LOCATION ZONE: ARM
LOCATION ZONE: SCALP

## 2017-04-28 NOTE — PROCEDURE: TREATMENT REGIMEN
Action 4: Continue
Detail Level: Zone
Continue Regimen: Taclonex sol qd\\nClobex Spray Qhs x 2 weeks, then use PRN for flares\\nEximer laser BIW, Pt has been treated with 10 of her approved 30 treatments
Plan: Reviewed etiology and treatment options. Pt has done well with Xtract, improved overall. Few areas in the scalp treated with ILK. Risks and benefits reviewed. Discussed Otezla as well. Pt would like to maintain with current regimen. Call with changes or concerns.

## 2017-04-28 NOTE — PROCEDURE: INTRALESIONAL KENALOG
Kenalog Preparation: Kenalog
Consent: The risks of atrophy were reviewed with the patient.
Administered By (Optional): MONTY
Detail Level: Detailed
Total Volume (Ccs): 0.6
Concentration Of Kenalog Solution Injected (Mg/Ml): 5.0
Include Z78.9 (Other Specified Conditions Influencing Health Status) As An Associated Diagnosis?: No
Expiration Date For Kenalog (Optional): DEC 2017
Medical Necessity Clause: This procedure was medically necessary because the lesions that were treated were:
Lot # For Kenalog (Optional): [id number]
X Size Of Lesion In Cm (Optional): 0

## 2017-04-28 NOTE — PROCEDURE: TREATMENT REGIMEN
Continue Regimen: Taclonex sol qd\\nClobex Spray Qhs x 2 weeks, then use PRN for flares\\nEximer laser BIW, Pt has been treated with 10 of her approved 30 treatments
Action 2: Continue
Plan: Reviewed etiology and treatment options. Pt has done well with Xtract, improved overall. Few areas in the scalp treated with ILK. Risks and benefits reviewed. Discussed Otezla as well. Pt would like to maintain with current regimen. Call with changes or concerns.
Detail Level: Zone

## 2017-04-28 NOTE — PROCEDURE: XTRAC LASER
% Increase/Decrease From Last Treatment: Increased by 5% per pt request
Total Energy In Joules: 103.56
Total Pulses (Will Not Render If 0): 0
Treatment Number: 12
Location #2: Elbow
Location #1: Scalp
Detail Level: Simple
Dose Setting #1 (Mj/Cm2): 854
Total Square Area In Cm2 (Required For Proper Billing): 124
Post-Care Instructions: I reviewed with the patient in detail post-care instructions. Patient should stay away from the sun and wear sun protection until treated areas are fully healed.
Dose Setting #2 (Mj/Cm2): 598
Consent: Written consent obtained, risks reviewed including but not limited to crusting, scabbing, blistering, scarring, darker or lighter pigmentary change, incidental hair removal, bruising, and/or incomplete removal.
Treatment Endpoint: no erythema

## 2017-04-28 NOTE — PROCEDURE: INTRALESIONAL KENALOG
Detail Level: Detailed
Kenalog Preparation: Kenalog
Total Volume (Ccs): 0.6
Include Z78.9 (Other Specified Conditions Influencing Health Status) As An Associated Diagnosis?: No
Consent: The risks of atrophy were reviewed with the patient.
Lot # For Kenalog (Optional): [id number]
Expiration Date For Kenalog (Optional): DEC 2017
Concentration Of Kenalog Solution Injected (Mg/Ml): 5.0
Administered By (Optional): JERRY
X Size Of Lesion In Cm (Optional): 0
Medical Necessity Clause: This procedure was medically necessary because the lesions that were treated were:

## 2017-04-28 NOTE — PROCEDURE: XTRAC LASER
Post-Care Instructions: I reviewed with the patient in detail post-care instructions. Patient should stay away from the sun and wear sun protection until treated areas are fully healed.
Total Pulses (Will Not Render If 0): 0
Location #2: Elbow
Detail Level: Simple
Location #1: Scalp
Total Square Area In Cm2 (Required For Proper Billing): 124
% Increase/Decrease From Last Treatment: Increased by 5% per pt request
Dose Setting #2 (Mj/Cm2): 593
Treatment Endpoint: no erythema
Total Energy In Joules: 103.56
Treatment Number: 12
Dose Setting #1 (Mj/Cm2): 855
Consent: Written consent obtained, risks reviewed including but not limited to crusting, scabbing, blistering, scarring, darker or lighter pigmentary change, incidental hair removal, bruising, and/or incomplete removal.

## 2017-05-08 ENCOUNTER — APPOINTMENT (RX ONLY)
Dept: URBAN - METROPOLITAN AREA CLINIC 70 | Facility: CLINIC | Age: 27
Setting detail: DERMATOLOGY
End: 2017-05-08

## 2017-05-08 ENCOUNTER — APPOINTMENT (RX ONLY)
Dept: URBAN - METROPOLITAN AREA CLINIC 71 | Facility: CLINIC | Age: 27
Setting detail: DERMATOLOGY
End: 2017-05-08

## 2017-05-08 DIAGNOSIS — L40.0 PSORIASIS VULGARIS: ICD-10-CM

## 2017-05-08 PROCEDURE — 96921 EXCIMER LSR PSRIASIS 250-500: CPT

## 2017-05-08 PROCEDURE — 99211 OFF/OP EST MAY X REQ PHY/QHP: CPT | Mod: 25

## 2017-05-08 PROCEDURE — ? XTRAC LASER

## 2017-05-08 ASSESSMENT — LOCATION ZONE DERM
LOCATION ZONE: ARM
LOCATION ZONE: SCALP
LOCATION ZONE: ARM
LOCATION ZONE: SCALP

## 2017-05-08 NOTE — PROCEDURE: XTRAC LASER
Dose Setting #1 (Mj/Cm2): 857
Location #1: Scalp
Total Square Area In Cm2 (Required For Proper Billing): 336
Dose Setting #2 (Mj/Cm2): 599
Post-Care Instructions: I reviewed with the patient in detail post-care instructions. Patient should stay away from the sun and wear sun protection until treated areas are fully healed.
Comments: Maintained dose per patient request.RIGO
Total Energy In Joules: 285.23
Detail Level: Simple
Location #2: Elbow
Total Pulses (Will Not Render If 0): 0
Consent: Written consent obtained, risks reviewed including but not limited to crusting, scabbing, blistering, scarring, darker or lighter pigmentary change, incidental hair removal, bruising, and/or incomplete removal.
Treatment Endpoint: no erythema
% Increase/Decrease From Last Treatment: Maintained dose
Treatment Number: 13

## 2017-05-08 NOTE — PROCEDURE: XTRAC LASER
Total Pulses (Will Not Render If 0): 0
Location #1: Scalp
Comments: Maintained dose per patient request.ISSA
Treatment Number: 13
Dose Setting #2 (Mj/Cm2): 590
Consent: Written consent obtained, risks reviewed including but not limited to crusting, scabbing, blistering, scarring, darker or lighter pigmentary change, incidental hair removal, bruising, and/or incomplete removal.
Total Energy In Joules: 285.23
Treatment Endpoint: no erythema
Location #2: Elbow
Detail Level: Simple
Total Square Area In Cm2 (Required For Proper Billing): 336
% Increase/Decrease From Last Treatment: Maintained dose
Dose Setting #1 (Mj/Cm2): 850
Post-Care Instructions: I reviewed with the patient in detail post-care instructions. Patient should stay away from the sun and wear sun protection until treated areas are fully healed.

## 2017-05-10 ENCOUNTER — APPOINTMENT (RX ONLY)
Dept: URBAN - METROPOLITAN AREA CLINIC 70 | Facility: CLINIC | Age: 27
Setting detail: DERMATOLOGY
End: 2017-05-10

## 2017-05-10 ENCOUNTER — APPOINTMENT (RX ONLY)
Dept: URBAN - METROPOLITAN AREA CLINIC 71 | Facility: CLINIC | Age: 27
Setting detail: DERMATOLOGY
End: 2017-05-10

## 2017-05-10 DIAGNOSIS — L40.0 PSORIASIS VULGARIS: ICD-10-CM

## 2017-05-10 PROCEDURE — ? XTRAC LASER

## 2017-05-10 PROCEDURE — 99211 OFF/OP EST MAY X REQ PHY/QHP: CPT | Mod: 25

## 2017-05-10 PROCEDURE — 96920 EXCIMER LSR PSRIASIS<250SQCM: CPT

## 2017-05-10 ASSESSMENT — LOCATION ZONE DERM
LOCATION ZONE: SCALP
LOCATION ZONE: ARM
LOCATION ZONE: SCALP
LOCATION ZONE: ARM

## 2017-05-10 NOTE — PROCEDURE: XTRAC LASER
Consent: Written consent obtained, risks reviewed including but not limited to crusting, scabbing, blistering, scarring, darker or lighter pigmentary change, incidental hair removal, bruising, and/or incomplete removal.
Location #1: Scalp
Detail Level: Simple
Total Square Area In Cm2 (Required For Proper Billing): 184
Total Energy In Joules: 154.68
Dose Setting #1 (Mj/Cm2): 851
Location #2: Elbow
Dose Setting #2 (Mj/Cm2): 594
Post-Care Instructions: I reviewed with the patient in detail post-care instructions. Patient should stay away from the sun and wear sun protection until treated areas are fully healed.
Treatment Number: 14
% Increase/Decrease From Last Treatment: Maintained dose
Comments: Maintained dose per patient request.ISSA
Treatment Endpoint: no erythema
Total Pulses (Will Not Render If 0): 0

## 2017-05-10 NOTE — PROCEDURE: XTRAC LASER
Post-Care Instructions: I reviewed with the patient in detail post-care instructions. Patient should stay away from the sun and wear sun protection until treated areas are fully healed.
Detail Level: Simple
Consent: Written consent obtained, risks reviewed including but not limited to crusting, scabbing, blistering, scarring, darker or lighter pigmentary change, incidental hair removal, bruising, and/or incomplete removal.
Total Square Area In Cm2 (Required For Proper Billing): 184
Dose Setting #1 (Mj/Cm2): 858
Comments: Maintained dose per patient request.RIGO
Treatment Endpoint: no erythema
Location #1: Scalp
Dose Setting #2 (Mj/Cm2): 599
% Increase/Decrease From Last Treatment: Maintained dose
Treatment Number: 14
Total Pulses (Will Not Render If 0): 0
Total Energy In Joules: 154.68
Location #2: Elbow

## 2017-05-15 ENCOUNTER — APPOINTMENT (RX ONLY)
Dept: URBAN - METROPOLITAN AREA CLINIC 70 | Facility: CLINIC | Age: 27
Setting detail: DERMATOLOGY
End: 2017-05-15

## 2017-05-15 ENCOUNTER — APPOINTMENT (RX ONLY)
Dept: URBAN - METROPOLITAN AREA CLINIC 71 | Facility: CLINIC | Age: 27
Setting detail: DERMATOLOGY
End: 2017-05-15

## 2017-05-15 DIAGNOSIS — L40.0 PSORIASIS VULGARIS: ICD-10-CM

## 2017-05-15 PROCEDURE — 96920 EXCIMER LSR PSRIASIS<250SQCM: CPT

## 2017-05-15 PROCEDURE — ? XTRAC LASER

## 2017-05-15 ASSESSMENT — LOCATION ZONE DERM
LOCATION ZONE: SCALP
LOCATION ZONE: SCALP
LOCATION ZONE: ARM
LOCATION ZONE: ARM

## 2017-05-15 NOTE — PROCEDURE: XTRAC LASER
Location #1: Scalp
Location #2: Elbow
% Increase/Decrease From Last Treatment: Increased dose by 10 percent per patient request.pineda
Treatment Number: 14
Post-Care Instructions: I reviewed with the patient in detail post-care instructions. Patient should stay away from the sun and wear sun protection until treated areas are fully healed.
Total Pulses (Will Not Render If 0): 0
Consent: Written consent obtained, risks reviewed including but not limited to crusting, scabbing, blistering, scarring, darker or lighter pigmentary change, incidental hair removal, bruising, and/or incomplete removal.
Total Square Area In Cm2 (Required For Proper Billing): n/a
Comments: Maintained dose per patient request.ISSA
Detail Level: Simple
Dose Setting #1 (Mj/Cm2): 940
Dose Setting #2 (Mj/Cm2): 650
Treatment Endpoint: no erythema

## 2017-05-15 NOTE — PROCEDURE: XTRAC LASER
Location #2: Elbow
Treatment Number: 14
Total Pulses (Will Not Render If 0): 0
Comments: Maintained dose per patient request.RIGO
Consent: Written consent obtained, risks reviewed including but not limited to crusting, scabbing, blistering, scarring, darker or lighter pigmentary change, incidental hair removal, bruising, and/or incomplete removal.
Dose Setting #2 (Mj/Cm2): 650
Treatment Endpoint: no erythema
Detail Level: Simple
Total Energy In Joules: n/a
Dose Setting #1 (Mj/Cm2): 940
Location #1: Scalp
% Increase/Decrease From Last Treatment: Increased dose by 10 percent per patient request.sofiya
Post-Care Instructions: I reviewed with the patient in detail post-care instructions. Patient should stay away from the sun and wear sun protection until treated areas are fully healed.

## 2017-05-19 ENCOUNTER — APPOINTMENT (RX ONLY)
Dept: URBAN - METROPOLITAN AREA CLINIC 70 | Facility: CLINIC | Age: 27
Setting detail: DERMATOLOGY
End: 2017-05-19

## 2017-05-19 ENCOUNTER — APPOINTMENT (RX ONLY)
Dept: URBAN - METROPOLITAN AREA CLINIC 71 | Facility: CLINIC | Age: 27
Setting detail: DERMATOLOGY
End: 2017-05-19

## 2017-05-19 DIAGNOSIS — L40.0 PSORIASIS VULGARIS: ICD-10-CM

## 2017-05-19 PROCEDURE — ? XTRAC LASER

## 2017-05-19 PROCEDURE — 99211 OFF/OP EST MAY X REQ PHY/QHP: CPT | Mod: 25

## 2017-05-19 PROCEDURE — 96920 EXCIMER LSR PSRIASIS<250SQCM: CPT

## 2017-05-19 ASSESSMENT — LOCATION DETAILED DESCRIPTION DERM
LOCATION DETAILED: LEFT OCCIPITAL SCALP
LOCATION DETAILED: LEFT ELBOW
LOCATION DETAILED: LEFT OCCIPITAL SCALP
LOCATION DETAILED: LEFT ELBOW

## 2017-05-19 ASSESSMENT — LOCATION ZONE DERM
LOCATION ZONE: SCALP
LOCATION ZONE: ARM
LOCATION ZONE: ARM
LOCATION ZONE: SCALP

## 2017-05-19 ASSESSMENT — LOCATION SIMPLE DESCRIPTION DERM
LOCATION SIMPLE: LEFT ELBOW
LOCATION SIMPLE: POSTERIOR SCALP
LOCATION SIMPLE: LEFT ELBOW
LOCATION SIMPLE: POSTERIOR SCALP

## 2017-05-19 NOTE — PROCEDURE: XTRAC LASER
Location #1: Scalp
Total Square Area In Cm2 (Required For Proper Billing): 267.
Dose Setting #1 (Mj/Cm2): 1040
Comments: Maintained dose per patient request.RIGO
Detail Level: Simple
% Increase/Decrease From Last Treatment: Increased dose by 10 percent  on scalp only  and maintained dose on elbow per patient request.sofiya
Post-Care Instructions: I reviewed with the patient in detail post-care instructions. Patient should stay away from the sun and wear sun protection until treated areas are fully healed.
Treatment Endpoint: no erythema
Total Pulses (Will Not Render If 0): 0
Total Energy In Joules: 3125
Location #2: Elbow
Treatment Number: 14
Dose Setting #2 (Mj/Cm2): 650
Consent: Written consent obtained, risks reviewed including but not limited to crusting, scabbing, blistering, scarring, darker or lighter pigmentary change, incidental hair removal, bruising, and/or incomplete removal.

## 2017-05-19 NOTE — PROCEDURE: XTRAC LASER
Treatment Endpoint: no erythema
Consent: Written consent obtained, risks reviewed including but not limited to crusting, scabbing, blistering, scarring, darker or lighter pigmentary change, incidental hair removal, bruising, and/or incomplete removal.
Dose Setting #1 (Mj/Cm2): 1040
Comments: Maintained dose per patient request.ISSA
% Increase/Decrease From Last Treatment: Increased dose by 10 percent  on scalp only  and maintained dose on elbow per patient request.pineda
Total Energy In Joules: 0901
Total Pulses (Will Not Render If 0): 0
Detail Level: Simple
Location #2: Elbow
Location #1: Scalp
Post-Care Instructions: I reviewed with the patient in detail post-care instructions. Patient should stay away from the sun and wear sun protection until treated areas are fully healed.
Treatment Number: 14
Dose Setting #2 (Mj/Cm2): 650
Total Square Area In Cm2 (Required For Proper Billing): 267.

## 2017-05-22 ENCOUNTER — APPOINTMENT (RX ONLY)
Dept: URBAN - METROPOLITAN AREA CLINIC 71 | Facility: CLINIC | Age: 27
Setting detail: DERMATOLOGY
End: 2017-05-22

## 2017-05-22 ENCOUNTER — APPOINTMENT (RX ONLY)
Dept: URBAN - METROPOLITAN AREA CLINIC 70 | Facility: CLINIC | Age: 27
Setting detail: DERMATOLOGY
End: 2017-05-22

## 2017-05-22 DIAGNOSIS — L40.0 PSORIASIS VULGARIS: ICD-10-CM

## 2017-05-22 PROCEDURE — 99211 OFF/OP EST MAY X REQ PHY/QHP: CPT | Mod: 25

## 2017-05-22 PROCEDURE — ? XTRAC LASER

## 2017-05-22 PROCEDURE — 96921 EXCIMER LSR PSRIASIS 250-500: CPT

## 2017-05-22 ASSESSMENT — LOCATION ZONE DERM
LOCATION ZONE: SCALP
LOCATION ZONE: SCALP
LOCATION ZONE: ARM
LOCATION ZONE: ARM

## 2017-05-22 NOTE — PROCEDURE: XTRAC LASER
Detail Level: Simple
Treatment Endpoint: no erythema
Location #2: Elbow
Dose Setting #2 (Mj/Cm2): 720
Location #1: Scalp
Post-Care Instructions: I reviewed with the patient in detail post-care instructions. Patient should stay away from the sun and wear sun protection until treated areas are fully healed.
Dose Setting #1 (Mj/Cm2): 1150
Treatment Number: 17
Total Pulses (Will Not Render If 0): 0
% Increase/Decrease From Last Treatment: Increased dose by 10 percent  on scalp and elbow per patient request.sofiya
Total Square Area In Cm2 (Required For Proper Billing): 363
Comments: Maintained dose per patient request.RIGO
Consent: Written consent obtained, risks reviewed including but not limited to crusting, scabbing, blistering, scarring, darker or lighter pigmentary change, incidental hair removal, bruising, and/or incomplete removal.

## 2017-05-22 NOTE — PROCEDURE: XTRAC LASER
Treatment Number: 17
% Increase/Decrease From Last Treatment: Increased dose by 10 percent  on scalp and elbow per patient request.pineda
Dose Setting #2 (Mj/Cm2): 720
Total Square Area In Cm2 (Required For Proper Billing): 363
Location #1: Scalp
Comments: Maintained dose per patient request.ISSA
Post-Care Instructions: I reviewed with the patient in detail post-care instructions. Patient should stay away from the sun and wear sun protection until treated areas are fully healed.
Treatment Endpoint: no erythema
Dose Setting #1 (Mj/Cm2): 1150
Consent: Written consent obtained, risks reviewed including but not limited to crusting, scabbing, blistering, scarring, darker or lighter pigmentary change, incidental hair removal, bruising, and/or incomplete removal.
Detail Level: Simple
Location #2: Elbow
Total Pulses (Will Not Render If 0): 0

## 2017-05-24 ENCOUNTER — APPOINTMENT (RX ONLY)
Dept: URBAN - METROPOLITAN AREA CLINIC 71 | Facility: CLINIC | Age: 27
Setting detail: DERMATOLOGY
End: 2017-05-24

## 2017-05-24 ENCOUNTER — APPOINTMENT (RX ONLY)
Dept: URBAN - METROPOLITAN AREA CLINIC 70 | Facility: CLINIC | Age: 27
Setting detail: DERMATOLOGY
End: 2017-05-24

## 2017-05-24 DIAGNOSIS — L40.0 PSORIASIS VULGARIS: ICD-10-CM

## 2017-05-24 PROBLEM — L29.8 OTHER PRURITUS: Status: ACTIVE | Noted: 2017-05-24

## 2017-05-24 PROCEDURE — ? XTRAC LASER

## 2017-05-24 PROCEDURE — 96921 EXCIMER LSR PSRIASIS 250-500: CPT

## 2017-05-24 PROCEDURE — 99211 OFF/OP EST MAY X REQ PHY/QHP: CPT | Mod: 25

## 2017-05-24 ASSESSMENT — LOCATION ZONE DERM
LOCATION ZONE: SCALP
LOCATION ZONE: ARM
LOCATION ZONE: SCALP
LOCATION ZONE: ARM

## 2017-05-24 NOTE — PROCEDURE: XTRAC LASER
Post-Care Instructions: I reviewed with the patient in detail post-care instructions. Patient should stay away from the sun and wear sun protection until treated areas are fully healed.
% Increase/Decrease From Last Treatment: Maintained dose on scalp and elbow per patient request.pineda
Location #2: Elbow
Location #1: Scalp
Dose Setting #1 (Mj/Cm2): 1150
Dose Setting #2 (Mj/Cm2): 720
Total Pulses (Will Not Render If 0): 0
Detail Level: Simple
Consent: Written consent obtained, risks reviewed including but not limited to crusting, scabbing, blistering, scarring, darker or lighter pigmentary change, incidental hair removal, bruising, and/or incomplete removal.
Comments: Maintained dose per patient request.ISSA
Treatment Endpoint: no erythema
Total Square Area In Cm2 (Required For Proper Billing): 303
Treatment Number: 18

## 2017-05-24 NOTE — PROCEDURE: XTRAC LASER
Total Pulses (Will Not Render If 0): 0
Consent: Written consent obtained, risks reviewed including but not limited to crusting, scabbing, blistering, scarring, darker or lighter pigmentary change, incidental hair removal, bruising, and/or incomplete removal.
% Increase/Decrease From Last Treatment: Maintained dose on scalp and elbow per patient request.sofiya
Comments: Maintained dose per patient request.RIGO
Treatment Number: 18
Detail Level: Simple
Location #2: Elbow
Location #1: Scalp
Dose Setting #2 (Mj/Cm2): 720
Treatment Endpoint: no erythema
Post-Care Instructions: I reviewed with the patient in detail post-care instructions. Patient should stay away from the sun and wear sun protection until treated areas are fully healed.
Total Square Area In Cm2 (Required For Proper Billing): 303
Dose Setting #1 (Mj/Cm2): 1150

## 2017-05-30 ENCOUNTER — APPOINTMENT (RX ONLY)
Dept: URBAN - METROPOLITAN AREA CLINIC 73 | Facility: CLINIC | Age: 27
Setting detail: DERMATOLOGY
End: 2017-05-30

## 2017-05-30 ENCOUNTER — APPOINTMENT (RX ONLY)
Dept: URBAN - METROPOLITAN AREA CLINIC 74 | Facility: CLINIC | Age: 27
Setting detail: DERMATOLOGY
End: 2017-05-30

## 2017-05-30 DIAGNOSIS — L40.0 PSORIASIS VULGARIS: ICD-10-CM

## 2017-05-30 PROBLEM — L29.8 OTHER PRURITUS: Status: ACTIVE | Noted: 2017-05-30

## 2017-05-30 PROCEDURE — ? XTRAC LASER

## 2017-05-30 PROCEDURE — 96921 EXCIMER LSR PSRIASIS 250-500: CPT

## 2017-05-30 PROCEDURE — 99211 OFF/OP EST MAY X REQ PHY/QHP: CPT | Mod: 25

## 2017-05-30 ASSESSMENT — LOCATION ZONE DERM
LOCATION ZONE: SCALP
LOCATION ZONE: ARM
LOCATION ZONE: ARM
LOCATION ZONE: SCALP

## 2017-05-30 NOTE — PROCEDURE: XTRAC LASER
Comments: Maintained dose per patient request.RIGO
Dose Setting #1 (Mj/Cm2): 4827
Total Energy In Joules: n/a
Treatment Number: 18
Total Square Area In Cm2 (Required For Proper Billing): 270
Location #2: Elbow
Treatment Endpoint: no erythema
Total Pulses (Will Not Render If 0): 0
Location #1: Scalp
Consent: Written consent obtained, risks reviewed including but not limited to crusting, scabbing, blistering, scarring, darker or lighter pigmentary change, incidental hair removal, bruising, and/or incomplete removal.
Detail Level: Simple
% Increase/Decrease From Last Treatment: Increased dose on scalp  by 10 %  and  maintained dose on elbow per patient request.sofiya
Post-Care Instructions: I reviewed with the patient in detail post-care instructions. Patient should stay away from the sun and wear sun protection until treated areas are fully healed.
Dose Setting #2 (Mj/Cm2): 720

## 2017-05-30 NOTE — PROCEDURE: XTRAC LASER
Treatment Endpoint: no erythema
Total Pulses (Will Not Render If 0): 0
Detail Level: Simple
% Increase/Decrease From Last Treatment: Increased dose on scalp  by 10 %  and  maintained dose on elbow per patient request.pineda
Dose Setting #1 (Mj/Cm2): 5019
Treatment Number: 18
Location #1: Scalp
Post-Care Instructions: I reviewed with the patient in detail post-care instructions. Patient should stay away from the sun and wear sun protection until treated areas are fully healed.
Total Square Area In Cm2 (Required For Proper Billing): 270
Dose Setting #2 (Mj/Cm2): 720
Location #2: Elbow
Comments: Maintained dose per patient request.ISSA
Consent: Written consent obtained, risks reviewed including but not limited to crusting, scabbing, blistering, scarring, darker or lighter pigmentary change, incidental hair removal, bruising, and/or incomplete removal.
Total Energy In Joules: n/a

## 2017-06-02 ENCOUNTER — APPOINTMENT (RX ONLY)
Dept: URBAN - METROPOLITAN AREA CLINIC 70 | Facility: CLINIC | Age: 27
Setting detail: DERMATOLOGY
End: 2017-06-02

## 2017-06-02 ENCOUNTER — APPOINTMENT (RX ONLY)
Dept: URBAN - METROPOLITAN AREA CLINIC 71 | Facility: CLINIC | Age: 27
Setting detail: DERMATOLOGY
End: 2017-06-02

## 2017-06-02 DIAGNOSIS — L40.0 PSORIASIS VULGARIS: ICD-10-CM

## 2017-06-02 PROCEDURE — 99211 OFF/OP EST MAY X REQ PHY/QHP: CPT | Mod: 25

## 2017-06-02 PROCEDURE — ? XTRAC LASER

## 2017-06-02 PROCEDURE — 96920 EXCIMER LSR PSRIASIS<250SQCM: CPT

## 2017-06-02 ASSESSMENT — LOCATION ZONE DERM
LOCATION ZONE: SCALP
LOCATION ZONE: SCALP
LOCATION ZONE: ARM
LOCATION ZONE: ARM

## 2017-06-02 NOTE — PROCEDURE: XTRAC LASER
Location #2: Elbow
Detail Level: Simple
Total Pulses (Will Not Render If 0): 0
Post-Care Instructions: I reviewed with the patient in detail post-care instructions. Patient should stay away from the sun and wear sun protection until treated areas are fully healed.
Dose Setting #1 (Mj/Cm2): 1400
Treatment Endpoint: no erythema
% Increase/Decrease From Last Treatment: Increased dose by 10 % on scalp and elbow per patient request.pineda
Total Energy In Joules: n/a
Total Square Area In Cm2 (Required For Proper Billing): 193
Treatment Number: 20
Location #1: Scalp
Dose Setting #2 (Mj/Cm2): 800
Comments: Maintained dose per patient request.ISSA
Consent: Written consent obtained, risks reviewed including but not limited to crusting, scabbing, blistering, scarring, darker or lighter pigmentary change, incidental hair removal, bruising, and/or incomplete removal.

## 2017-06-02 NOTE — PROCEDURE: XTRAC LASER
Treatment Number: 20
Dose Setting #2 (Mj/Cm2): 800
Total Pulses (Will Not Render If 0): 0
Total Energy In Joules: n/a
Location #1: Scalp
Location #2: Elbow
% Increase/Decrease From Last Treatment: Increased dose by 10 % on scalp and elbow per patient request.sofiya
Consent: Written consent obtained, risks reviewed including but not limited to crusting, scabbing, blistering, scarring, darker or lighter pigmentary change, incidental hair removal, bruising, and/or incomplete removal.
Dose Setting #1 (Mj/Cm2): 1400
Total Square Area In Cm2 (Required For Proper Billing): 193
Treatment Endpoint: no erythema
Detail Level: Simple
Comments: Maintained dose per patient request.RIGO
Post-Care Instructions: I reviewed with the patient in detail post-care instructions. Patient should stay away from the sun and wear sun protection until treated areas are fully healed.

## 2017-06-05 ENCOUNTER — APPOINTMENT (RX ONLY)
Dept: URBAN - METROPOLITAN AREA CLINIC 70 | Facility: CLINIC | Age: 27
Setting detail: DERMATOLOGY
End: 2017-06-05

## 2017-06-05 ENCOUNTER — APPOINTMENT (RX ONLY)
Dept: URBAN - METROPOLITAN AREA CLINIC 71 | Facility: CLINIC | Age: 27
Setting detail: DERMATOLOGY
End: 2017-06-05

## 2017-06-05 DIAGNOSIS — L40.0 PSORIASIS VULGARIS: ICD-10-CM

## 2017-06-05 PROCEDURE — 99211 OFF/OP EST MAY X REQ PHY/QHP: CPT | Mod: 25

## 2017-06-05 PROCEDURE — ? XTRAC LASER

## 2017-06-05 PROCEDURE — 96920 EXCIMER LSR PSRIASIS<250SQCM: CPT

## 2017-06-05 ASSESSMENT — LOCATION SIMPLE DESCRIPTION DERM
LOCATION SIMPLE: POSTERIOR SCALP
LOCATION SIMPLE: LEFT ELBOW
LOCATION SIMPLE: LEFT ELBOW
LOCATION SIMPLE: POSTERIOR SCALP

## 2017-06-05 ASSESSMENT — LOCATION ZONE DERM
LOCATION ZONE: ARM
LOCATION ZONE: SCALP
LOCATION ZONE: ARM
LOCATION ZONE: SCALP

## 2017-06-05 NOTE — PROCEDURE: XTRAC LASER
% Increase/Decrease From Last Treatment: Increased dose by 10 % on scalp and elbow per patient request.sofiya
Detail Level: Simple
Total Pulses (Will Not Render If 0): 0
Post-Care Instructions: I reviewed with the patient in detail post-care instructions. Patient should stay away from the sun and wear sun protection until treated areas are fully healed.
Total Square Area In Cm2 (Required For Proper Billing): 132
Location #2: Elbow
Comments: Maintained dose per patient request.RIGO
Treatment Number: 21
Dose Setting #2 (Mj/Cm2): 880
Location #1: Scalp
Total Energy In Joules: n/a
Consent: Written consent obtained, risks reviewed including but not limited to crusting, scabbing, blistering, scarring, darker or lighter pigmentary change, incidental hair removal, bruising, and/or incomplete removal.
Dose Setting #1 (Mj/Cm2): 5790
Treatment Endpoint: no erythema

## 2017-06-05 NOTE — PROCEDURE: XTRAC LASER
Consent: Written consent obtained, risks reviewed including but not limited to crusting, scabbing, blistering, scarring, darker or lighter pigmentary change, incidental hair removal, bruising, and/or incomplete removal.
Dose Setting #2 (Mj/Cm2): 880
Detail Level: Simple
Total Square Area In Cm2 (Required For Proper Billing): 132
Dose Setting #1 (Mj/Cm2): 6060
Location #2: Elbow
% Increase/Decrease From Last Treatment: Increased dose by 10 % on scalp and elbow per patient request.pineda
Treatment Number: 21
Treatment Endpoint: no erythema
Total Pulses (Will Not Render If 0): 0
Location #1: Scalp
Comments: Maintained dose per patient request.ISSA
Total Energy In Joules: n/a
Post-Care Instructions: I reviewed with the patient in detail post-care instructions. Patient should stay away from the sun and wear sun protection until treated areas are fully healed.

## 2017-06-16 ENCOUNTER — APPOINTMENT (RX ONLY)
Dept: URBAN - METROPOLITAN AREA CLINIC 71 | Facility: CLINIC | Age: 27
Setting detail: DERMATOLOGY
End: 2017-06-16

## 2017-06-16 ENCOUNTER — APPOINTMENT (RX ONLY)
Dept: URBAN - METROPOLITAN AREA CLINIC 70 | Facility: CLINIC | Age: 27
Setting detail: DERMATOLOGY
End: 2017-06-16

## 2017-06-16 DIAGNOSIS — L40.0 PSORIASIS VULGARIS: ICD-10-CM | Status: IMPROVED

## 2017-06-16 DIAGNOSIS — Z71.89 OTHER SPECIFIED COUNSELING: ICD-10-CM

## 2017-06-16 DIAGNOSIS — Z79.899 OTHER LONG TERM (CURRENT) DRUG THERAPY: ICD-10-CM

## 2017-06-16 DIAGNOSIS — L40.0 PSORIASIS VULGARIS: ICD-10-CM

## 2017-06-16 PROBLEM — D48.5 NEOPLASM OF UNCERTAIN BEHAVIOR OF SKIN: Status: ACTIVE | Noted: 2017-06-16

## 2017-06-16 PROCEDURE — ? COUNSELING

## 2017-06-16 PROCEDURE — 96920 EXCIMER LSR PSRIASIS<250SQCM: CPT

## 2017-06-16 PROCEDURE — ? HIGH RISK MEDICATION MONITORING

## 2017-06-16 PROCEDURE — ? PRESCRIPTION

## 2017-06-16 PROCEDURE — ? XTRAC LASER

## 2017-06-16 PROCEDURE — 99213 OFFICE O/P EST LOW 20 MIN: CPT

## 2017-06-16 PROCEDURE — ? TREATMENT REGIMEN

## 2017-06-16 RX ORDER — CALCIPOTRIENE AND BETAMETHASONE DIPROPIONATE 50; .5 UG/G; MG/G
AEROSOL, FOAM TOPICAL
Qty: 1 | Refills: 2 | Status: ERX | COMMUNITY
Start: 2017-06-16

## 2017-06-16 RX ADMIN — CALCIPOTRIENE AND BETAMETHASONE DIPROPIONATE: 50; .5 AEROSOL, FOAM TOPICAL at 00:00

## 2017-06-16 ASSESSMENT — LOCATION DETAILED DESCRIPTION DERM
LOCATION DETAILED: LEFT SUPERIOR PARIETAL SCALP
LOCATION DETAILED: LEFT ELBOW
LOCATION DETAILED: LEFT SUPERIOR OCCIPITAL SCALP
LOCATION DETAILED: RIGHT CENTRAL PARIETAL SCALP
LOCATION DETAILED: LEFT CENTRAL PARIETAL SCALP
LOCATION DETAILED: LEFT OCCIPITAL SCALP
LOCATION DETAILED: LEFT ELBOW
LOCATION DETAILED: LEFT ELBOW
LOCATION DETAILED: HAIR
LOCATION DETAILED: LEFT SUPERIOR OCCIPITAL SCALP
LOCATION DETAILED: RIGHT CENTRAL PARIETAL SCALP
LOCATION DETAILED: LEFT OCCIPITAL SCALP
LOCATION DETAILED: LEFT ELBOW
LOCATION DETAILED: LEFT CENTRAL PARIETAL SCALP
LOCATION DETAILED: HAIR
LOCATION DETAILED: LEFT SUPERIOR PARIETAL SCALP

## 2017-06-16 ASSESSMENT — LOCATION ZONE DERM
LOCATION ZONE: ARM
LOCATION ZONE: SCALP
LOCATION ZONE: ARM
LOCATION ZONE: SCALP
LOCATION ZONE: ARM
LOCATION ZONE: ARM

## 2017-06-16 ASSESSMENT — LOCATION SIMPLE DESCRIPTION DERM
LOCATION SIMPLE: SCALP
LOCATION SIMPLE: POSTERIOR SCALP
LOCATION SIMPLE: POSTERIOR SCALP
LOCATION SIMPLE: LEFT OCCIPITAL SCALP
LOCATION SIMPLE: LEFT ELBOW
LOCATION SIMPLE: LEFT OCCIPITAL SCALP
LOCATION SIMPLE: SCALP
LOCATION SIMPLE: LEFT ELBOW
LOCATION SIMPLE: LEFT ELBOW
LOCATION SIMPLE: HAIR
LOCATION SIMPLE: HAIR
LOCATION SIMPLE: LEFT ELBOW

## 2017-06-16 ASSESSMENT — BSA PSORIASIS
% BODY COVERED IN PSORIASIS: 3
% BODY COVERED IN PSORIASIS: 3

## 2017-06-16 ASSESSMENT — PGA PSORIASIS
PGA PSORIASIS: MINIMAL (MINIMAL PLAQUE ELEVATION, FAINT ERYTHEMA, OCCASIONAL FINE SCALE)
PGA PSORIASIS: MINIMAL (MINIMAL PLAQUE ELEVATION, FAINT ERYTHEMA, OCCASIONAL FINE SCALE)

## 2017-06-16 NOTE — PROCEDURE: TREATMENT REGIMEN
Plan: Reviewed etiology and treatment options. Pt has done well with Xtract, improved overall. Spoke about Otezla being the next step if patient is wanting to try something else. Patient will continue with the Xtract and the topicals as needed. Will send Enstilar to [facility] to see if it will be covered.
Action 3: Continue
Continue Regimen: Clobex Spray PRN\\nXtract laser BIW, Pt has been treated with 20 of her approved 30 treatments\\nP&S shampoo QOD
Samples Given: Enstilar
Discontinue Regimen: Talconex (not covered under her insurance)
Detail Level: Zone
Initiate Regimen: Enstilar PRN (if covered via her insurance)

## 2017-06-16 NOTE — PROCEDURE: XTRAC LASER
Total Pulses (Will Not Render If 0): 0
Dose Setting #1 (Mj/Cm2): 155
Post-Care Instructions: I reviewed with the patient in detail post-care instructions. Patient should stay away from the sun and wear sun protection until treated areas are fully healed.
Treatment Endpoint: no erythema
Comments: Maintained dose per patient request.RIGO
Dose Setting #2 (Mj/Cm2): 660
Total Energy In Joules: n/a
Consent: Written consent obtained, risks reviewed including but not limited to crusting, scabbing, blistering, scarring, darker or lighter pigmentary change, incidental hair removal, bruising, and/or incomplete removal.
% Increase/Decrease From Last Treatment: decreased pt dose by 25% due to 11 day gap in treatments. CHANTEL
Detail Level: Simple
Treatment Number: 22
Total Square Area In Cm2 (Required For Proper Billing): 69
Location #2: Elbow
Location #1: Scalp

## 2017-06-16 NOTE — PROCEDURE: XTRAC LASER
Treatment Endpoint: no erythema
Location #2: Elbow
Dose Setting #1 (Mj/Cm2): 1553
% Increase/Decrease From Last Treatment: decreased pt dose by 25% due to 11 day gap in treatments. DELFINO
Location #1: Scalp
Total Energy In Joules: n/a
Comments: Maintained dose per patient request.ISSA
Treatment Number: 22
Dose Setting #2 (Mj/Cm2): 660
Detail Level: Simple
Total Square Area In Cm2 (Required For Proper Billing): 69
Total Pulses (Will Not Render If 0): 0
Consent: Written consent obtained, risks reviewed including but not limited to crusting, scabbing, blistering, scarring, darker or lighter pigmentary change, incidental hair removal, bruising, and/or incomplete removal.
Post-Care Instructions: I reviewed with the patient in detail post-care instructions. Patient should stay away from the sun and wear sun protection until treated areas are fully healed.

## 2017-06-16 NOTE — PROCEDURE: TREATMENT REGIMEN
Plan: Reviewed etiology and treatment options. Pt has done well with Xtract, improved overall. Spoke about Otezla being the next step if patient is wanting to try something else. Patient will continue with the Xtract and the topicals as needed. Will send Enstilar to [facility] to see if it will be covered.
Samples Given: Enstilar
Action 1: Continue
Initiate Regimen: Enstilar PRN (if covered via her insurance)
Discontinue Regimen: Talconex (not covered under her insurance)
Continue Regimen: Clobex Spray PRN\\nXtract laser BIW, Pt has been treated with 20 of her approved 30 treatments\\nP&S shampoo QOD
Detail Level: Zone

## 2017-06-21 ENCOUNTER — APPOINTMENT (RX ONLY)
Dept: URBAN - METROPOLITAN AREA CLINIC 73 | Facility: CLINIC | Age: 27
Setting detail: DERMATOLOGY
End: 2017-06-21

## 2017-06-21 ENCOUNTER — APPOINTMENT (RX ONLY)
Dept: URBAN - METROPOLITAN AREA CLINIC 74 | Facility: CLINIC | Age: 27
Setting detail: DERMATOLOGY
End: 2017-06-21

## 2017-06-21 DIAGNOSIS — L40.0 PSORIASIS VULGARIS: ICD-10-CM

## 2017-06-21 PROCEDURE — ? XTRAC LASER

## 2017-06-21 PROCEDURE — 96920 EXCIMER LSR PSRIASIS<250SQCM: CPT

## 2017-06-21 ASSESSMENT — LOCATION DETAILED DESCRIPTION DERM
LOCATION DETAILED: POSTERIOR MID-PARIETAL SCALP
LOCATION DETAILED: RIGHT SUPERIOR OCCIPITAL SCALP
LOCATION DETAILED: RIGHT SUPERIOR OCCIPITAL SCALP
LOCATION DETAILED: RIGHT OCCIPITAL SCALP
LOCATION DETAILED: LEFT OCCIPITAL SCALP
LOCATION DETAILED: LEFT PROXIMAL DORSAL FOREARM
LOCATION DETAILED: LEFT PROXIMAL DORSAL FOREARM
LOCATION DETAILED: LEFT OCCIPITAL SCALP
LOCATION DETAILED: POSTERIOR MID-PARIETAL SCALP
LOCATION DETAILED: RIGHT OCCIPITAL SCALP
LOCATION DETAILED: LEFT SUPERIOR OCCIPITAL SCALP
LOCATION DETAILED: LEFT SUPERIOR OCCIPITAL SCALP

## 2017-06-21 ASSESSMENT — LOCATION ZONE DERM
LOCATION ZONE: ARM
LOCATION ZONE: SCALP
LOCATION ZONE: ARM
LOCATION ZONE: SCALP

## 2017-06-21 ASSESSMENT — LOCATION SIMPLE DESCRIPTION DERM
LOCATION SIMPLE: POSTERIOR SCALP
LOCATION SIMPLE: POSTERIOR SCALP
LOCATION SIMPLE: LEFT FOREARM
LOCATION SIMPLE: LEFT FOREARM

## 2017-06-21 NOTE — PROCEDURE: XTRAC LASER
Dose Setting #2 (Mj/Cm2): 760
Location #2: elbow
Dose Setting #1 (Mj/Cm2): 2324
Total Square Area In Cm2 (Required For Proper Billing): 89
Total Pulses (Will Not Render If 0): 0
Location #1: scalp
Treatment Number: 22
Consent: Written consent obtained, risks reviewed including but not limited to crusting, scabbing, blistering, scarring, darker or lighter pigmentary change, incidental hair removal, bruising, and/or incomplete removal.
Detail Level: Detailed
Post-Care Instructions: I reviewed with the patient in detail post-care instructions. Patient should stay away from the sun and wear sun protection until treated areas are fully healed.

## 2017-06-21 NOTE — PROCEDURE: XTRAC LASER
Total Pulses (Will Not Render If 0): 0
Detail Level: Detailed
Total Square Area In Cm2 (Required For Proper Billing): 89
Treatment Number: 22
Post-Care Instructions: I reviewed with the patient in detail post-care instructions. Patient should stay away from the sun and wear sun protection until treated areas are fully healed.
Location #1: scalp
Dose Setting #2 (Mj/Cm2): 760
Consent: Written consent obtained, risks reviewed including but not limited to crusting, scabbing, blistering, scarring, darker or lighter pigmentary change, incidental hair removal, bruising, and/or incomplete removal.
Location #2: elbow
Dose Setting #1 (Mj/Cm2): 5320

## 2017-06-26 ENCOUNTER — APPOINTMENT (RX ONLY)
Dept: URBAN - METROPOLITAN AREA CLINIC 70 | Facility: CLINIC | Age: 27
Setting detail: DERMATOLOGY
End: 2017-06-26

## 2017-06-26 ENCOUNTER — APPOINTMENT (RX ONLY)
Dept: URBAN - METROPOLITAN AREA CLINIC 71 | Facility: CLINIC | Age: 27
Setting detail: DERMATOLOGY
End: 2017-06-26

## 2017-06-26 DIAGNOSIS — L40.0 PSORIASIS VULGARIS: ICD-10-CM

## 2017-06-26 PROBLEM — L29.8 OTHER PRURITUS: Status: ACTIVE | Noted: 2017-06-26

## 2017-06-26 PROCEDURE — ? XTRAC LASER

## 2017-06-26 PROCEDURE — 96920 EXCIMER LSR PSRIASIS<250SQCM: CPT

## 2017-06-26 PROCEDURE — 99211 OFF/OP EST MAY X REQ PHY/QHP: CPT | Mod: 25

## 2017-06-26 ASSESSMENT — LOCATION DETAILED DESCRIPTION DERM
LOCATION DETAILED: LEFT SUPERIOR OCCIPITAL SCALP
LOCATION DETAILED: POSTERIOR MID-PARIETAL SCALP
LOCATION DETAILED: LEFT PROXIMAL DORSAL FOREARM
LOCATION DETAILED: RIGHT OCCIPITAL SCALP
LOCATION DETAILED: RIGHT OCCIPITAL SCALP
LOCATION DETAILED: POSTERIOR MID-PARIETAL SCALP
LOCATION DETAILED: LEFT PROXIMAL DORSAL FOREARM
LOCATION DETAILED: RIGHT SUPERIOR OCCIPITAL SCALP
LOCATION DETAILED: LEFT OCCIPITAL SCALP
LOCATION DETAILED: LEFT SUPERIOR OCCIPITAL SCALP
LOCATION DETAILED: LEFT OCCIPITAL SCALP
LOCATION DETAILED: RIGHT SUPERIOR OCCIPITAL SCALP

## 2017-06-26 ASSESSMENT — LOCATION SIMPLE DESCRIPTION DERM
LOCATION SIMPLE: POSTERIOR SCALP
LOCATION SIMPLE: LEFT FOREARM
LOCATION SIMPLE: LEFT FOREARM
LOCATION SIMPLE: POSTERIOR SCALP

## 2017-06-26 ASSESSMENT — LOCATION ZONE DERM
LOCATION ZONE: SCALP
LOCATION ZONE: SCALP
LOCATION ZONE: ARM
LOCATION ZONE: ARM

## 2017-06-26 NOTE — PROCEDURE: XTRAC LASER
Detail Level: Detailed
Total Square Area In Cm2 (Required For Proper Billing): 105
Dose Setting #2 (Mj/Cm2): 840
Location #2: elbow
Consent: Written consent obtained, risks reviewed including but not limited to crusting, scabbing, blistering, scarring, darker or lighter pigmentary change, incidental hair removal, bruising, and/or incomplete removal.
Treatment Number: 23
Dose Setting #1 (Mj/Cm2): 6865
Location #1: scalp
Comments: Increased dose by 10%. MA
Total Pulses (Will Not Render If 0): 0
Post-Care Instructions: I reviewed with the patient in detail post-care instructions. Patient should stay away from the sun and wear sun protection until treated areas are fully healed.

## 2017-06-26 NOTE — PROCEDURE: XTRAC LASER
Location #1: scalp
Consent: Written consent obtained, risks reviewed including but not limited to crusting, scabbing, blistering, scarring, darker or lighter pigmentary change, incidental hair removal, bruising, and/or incomplete removal.
Detail Level: Detailed
Post-Care Instructions: I reviewed with the patient in detail post-care instructions. Patient should stay away from the sun and wear sun protection until treated areas are fully healed.
Dose Setting #2 (Mj/Cm2): 840
Comments: Increased dose by 10%. MA
Location #2: elbow
Total Pulses (Will Not Render If 0): 0
Dose Setting #1 (Mj/Cm2): 0985
Total Square Area In Cm2 (Required For Proper Billing): 105
Treatment Number: 23

## 2017-06-30 ENCOUNTER — APPOINTMENT (RX ONLY)
Dept: URBAN - METROPOLITAN AREA CLINIC 71 | Facility: CLINIC | Age: 27
Setting detail: DERMATOLOGY
End: 2017-06-30

## 2017-06-30 ENCOUNTER — APPOINTMENT (RX ONLY)
Dept: URBAN - METROPOLITAN AREA CLINIC 70 | Facility: CLINIC | Age: 27
Setting detail: DERMATOLOGY
End: 2017-06-30

## 2017-06-30 DIAGNOSIS — L40.0 PSORIASIS VULGARIS: ICD-10-CM

## 2017-06-30 PROCEDURE — ? XTRAC LASER

## 2017-06-30 PROCEDURE — 96920 EXCIMER LSR PSRIASIS<250SQCM: CPT

## 2017-06-30 ASSESSMENT — LOCATION DETAILED DESCRIPTION DERM
LOCATION DETAILED: RIGHT OCCIPITAL SCALP
LOCATION DETAILED: POSTERIOR MID-PARIETAL SCALP
LOCATION DETAILED: LEFT PROXIMAL DORSAL FOREARM
LOCATION DETAILED: LEFT SUPERIOR OCCIPITAL SCALP
LOCATION DETAILED: POSTERIOR MID-PARIETAL SCALP
LOCATION DETAILED: LEFT PROXIMAL DORSAL FOREARM
LOCATION DETAILED: LEFT OCCIPITAL SCALP
LOCATION DETAILED: LEFT OCCIPITAL SCALP
LOCATION DETAILED: RIGHT OCCIPITAL SCALP
LOCATION DETAILED: LEFT SUPERIOR OCCIPITAL SCALP
LOCATION DETAILED: RIGHT SUPERIOR OCCIPITAL SCALP
LOCATION DETAILED: RIGHT SUPERIOR OCCIPITAL SCALP

## 2017-06-30 ASSESSMENT — LOCATION ZONE DERM
LOCATION ZONE: SCALP
LOCATION ZONE: ARM
LOCATION ZONE: SCALP
LOCATION ZONE: ARM

## 2017-06-30 NOTE — PROCEDURE: XTRAC LASER
Dose Setting #1 (Mj/Cm2): 4900
Post-Care Instructions: I reviewed with the patient in detail post-care instructions. Patient should stay away from the sun and wear sun protection until treated areas are fully healed.
Detail Level: Detailed
Location #1: scalp
Consent: Written consent obtained, risks reviewed including but not limited to crusting, scabbing, blistering, scarring, darker or lighter pigmentary change, incidental hair removal, bruising, and/or incomplete removal.
Total Pulses (Will Not Render If 0): 0
Dose Setting #2 (Mj/Cm2): 920
Comments: Increased dose on scalp by 15% and increased by 10% on the elbow. 
Total Square Area In Cm2 (Required For Proper Billing): 128
Location #2: elbow
Treatment Number: 25

## 2017-06-30 NOTE — PROCEDURE: XTRAC LASER
Dose Setting #2 (Mj/Cm2): 920
Location #1: scalp
Treatment Number: 25
Dose Setting #1 (Mj/Cm2): 2172
Total Pulses (Will Not Render If 0): 0
Detail Level: Detailed
Total Square Area In Cm2 (Required For Proper Billing): 128
Location #2: elbow
Comments: Increased dose on scalp by 15% and increased by 10% on the elbow. 
Post-Care Instructions: I reviewed with the patient in detail post-care instructions. Patient should stay away from the sun and wear sun protection until treated areas are fully healed.
Consent: Written consent obtained, risks reviewed including but not limited to crusting, scabbing, blistering, scarring, darker or lighter pigmentary change, incidental hair removal, bruising, and/or incomplete removal.

## 2017-07-03 ENCOUNTER — APPOINTMENT (RX ONLY)
Dept: URBAN - METROPOLITAN AREA CLINIC 71 | Facility: CLINIC | Age: 27
Setting detail: DERMATOLOGY
End: 2017-07-03

## 2017-07-03 ENCOUNTER — APPOINTMENT (RX ONLY)
Dept: URBAN - METROPOLITAN AREA CLINIC 70 | Facility: CLINIC | Age: 27
Setting detail: DERMATOLOGY
End: 2017-07-03

## 2017-07-03 DIAGNOSIS — L40.0 PSORIASIS VULGARIS: ICD-10-CM

## 2017-07-03 PROCEDURE — 99211 OFF/OP EST MAY X REQ PHY/QHP: CPT | Mod: 25

## 2017-07-03 PROCEDURE — 96920 EXCIMER LSR PSRIASIS<250SQCM: CPT

## 2017-07-03 PROCEDURE — ? XTRAC LASER

## 2017-07-03 ASSESSMENT — LOCATION DETAILED DESCRIPTION DERM
LOCATION DETAILED: LEFT PROXIMAL DORSAL FOREARM
LOCATION DETAILED: POSTERIOR MID-PARIETAL SCALP
LOCATION DETAILED: LEFT OCCIPITAL SCALP
LOCATION DETAILED: RIGHT SUPERIOR OCCIPITAL SCALP
LOCATION DETAILED: LEFT SUPERIOR OCCIPITAL SCALP
LOCATION DETAILED: RIGHT SUPERIOR OCCIPITAL SCALP
LOCATION DETAILED: RIGHT OCCIPITAL SCALP
LOCATION DETAILED: LEFT OCCIPITAL SCALP
LOCATION DETAILED: POSTERIOR MID-PARIETAL SCALP
LOCATION DETAILED: LEFT SUPERIOR OCCIPITAL SCALP
LOCATION DETAILED: LEFT PROXIMAL DORSAL FOREARM
LOCATION DETAILED: RIGHT OCCIPITAL SCALP

## 2017-07-03 ASSESSMENT — LOCATION ZONE DERM
LOCATION ZONE: SCALP
LOCATION ZONE: ARM
LOCATION ZONE: SCALP
LOCATION ZONE: ARM

## 2017-07-03 ASSESSMENT — LOCATION SIMPLE DESCRIPTION DERM
LOCATION SIMPLE: LEFT FOREARM
LOCATION SIMPLE: LEFT FOREARM
LOCATION SIMPLE: POSTERIOR SCALP
LOCATION SIMPLE: POSTERIOR SCALP

## 2017-07-03 NOTE — PROCEDURE: XTRAC LASER
Location #2: elbow
Dose Setting #1 (Mj/Cm2): 0167
Total Square Area In Cm2 (Required For Proper Billing): 132
Comments: Increased both sites by 10%. MA
Total Pulses (Will Not Render If 0): 0
Location #1: scalp
Post-Care Instructions: I reviewed with the patient in detail post-care instructions. Patient should stay away from the sun and wear sun protection until treated areas are fully healed.
Dose Setting #2 (Mj/Cm2): 1020
Consent: Written consent obtained, risks reviewed including but not limited to crusting, scabbing, blistering, scarring, darker or lighter pigmentary change, incidental hair removal, bruising, and/or incomplete removal.
Detail Level: Zone
Treatment Number: 26

## 2017-07-03 NOTE — PROCEDURE: XTRAC LASER
Comments: Increased both sites by 10%. MA
Post-Care Instructions: I reviewed with the patient in detail post-care instructions. Patient should stay away from the sun and wear sun protection until treated areas are fully healed.
Location #1: scalp
Consent: Written consent obtained, risks reviewed including but not limited to crusting, scabbing, blistering, scarring, darker or lighter pigmentary change, incidental hair removal, bruising, and/or incomplete removal.
Total Pulses (Will Not Render If 0): 0
Dose Setting #2 (Mj/Cm2): 1020
Treatment Number: 26
Detail Level: Zone
Location #2: elbow
Total Square Area In Cm2 (Required For Proper Billing): 132
Dose Setting #1 (Mj/Cm2): 4260

## 2017-07-07 ENCOUNTER — APPOINTMENT (RX ONLY)
Dept: URBAN - METROPOLITAN AREA CLINIC 73 | Facility: CLINIC | Age: 27
Setting detail: DERMATOLOGY
End: 2017-07-07

## 2017-07-07 ENCOUNTER — APPOINTMENT (RX ONLY)
Dept: URBAN - METROPOLITAN AREA CLINIC 74 | Facility: CLINIC | Age: 27
Setting detail: DERMATOLOGY
End: 2017-07-07

## 2017-07-07 DIAGNOSIS — L40.0 PSORIASIS VULGARIS: ICD-10-CM

## 2017-07-07 PROCEDURE — 96920 EXCIMER LSR PSRIASIS<250SQCM: CPT

## 2017-07-07 PROCEDURE — ? XTRAC LASER

## 2017-07-07 ASSESSMENT — LOCATION DETAILED DESCRIPTION DERM
LOCATION DETAILED: LEFT PROXIMAL DORSAL FOREARM
LOCATION DETAILED: LEFT SUPERIOR OCCIPITAL SCALP
LOCATION DETAILED: RIGHT SUPERIOR OCCIPITAL SCALP
LOCATION DETAILED: RIGHT OCCIPITAL SCALP
LOCATION DETAILED: POSTERIOR MID-PARIETAL SCALP
LOCATION DETAILED: LEFT OCCIPITAL SCALP
LOCATION DETAILED: LEFT SUPERIOR OCCIPITAL SCALP
LOCATION DETAILED: POSTERIOR MID-PARIETAL SCALP
LOCATION DETAILED: RIGHT OCCIPITAL SCALP
LOCATION DETAILED: RIGHT SUPERIOR OCCIPITAL SCALP
LOCATION DETAILED: LEFT PROXIMAL DORSAL FOREARM
LOCATION DETAILED: LEFT OCCIPITAL SCALP

## 2017-07-07 ASSESSMENT — LOCATION ZONE DERM
LOCATION ZONE: ARM
LOCATION ZONE: ARM
LOCATION ZONE: SCALP
LOCATION ZONE: SCALP

## 2017-07-07 NOTE — PROCEDURE: XTRAC LASER
Detail Level: Zone
Total Square Area In Cm2 (Required For Proper Billing): 82
Treatment Number: 27
Post-Care Instructions: I reviewed with the patient in detail post-care instructions. Patient should stay away from the sun and wear sun protection until treated areas are fully healed.
Consent: Written consent obtained, risks reviewed including but not limited to crusting, scabbing, blistering, scarring, darker or lighter pigmentary change, incidental hair removal, bruising, and/or incomplete removal.
Location #2: elbow
Dose Setting #1 (Mj/Cm2): 2060
Dose Setting #2 (Mj/Cm2): 5040
Total Pulses (Will Not Render If 0): 0
Comments: Increased both sites by 10%. MA
Location #1: scalp

## 2017-07-07 NOTE — PROCEDURE: XTRAC LASER
Total Square Area In Cm2 (Required For Proper Billing): 82
Dose Setting #2 (Mj/Cm2): 7619
Consent: Written consent obtained, risks reviewed including but not limited to crusting, scabbing, blistering, scarring, darker or lighter pigmentary change, incidental hair removal, bruising, and/or incomplete removal.
Dose Setting #1 (Mj/Cm2): 2060
Post-Care Instructions: I reviewed with the patient in detail post-care instructions. Patient should stay away from the sun and wear sun protection until treated areas are fully healed.
Total Pulses (Will Not Render If 0): 0
Location #1: scalp
Location #2: elbow
Treatment Number: 27
Comments: Increased both sites by 10%. MA
Detail Level: Zone

## 2017-07-11 ENCOUNTER — APPOINTMENT (RX ONLY)
Dept: URBAN - METROPOLITAN AREA CLINIC 71 | Facility: CLINIC | Age: 27
Setting detail: DERMATOLOGY
End: 2017-07-11

## 2017-07-11 ENCOUNTER — APPOINTMENT (RX ONLY)
Dept: URBAN - METROPOLITAN AREA CLINIC 70 | Facility: CLINIC | Age: 27
Setting detail: DERMATOLOGY
End: 2017-07-11

## 2017-07-11 DIAGNOSIS — L40.0 PSORIASIS VULGARIS: ICD-10-CM

## 2017-07-11 PROBLEM — D48.5 NEOPLASM OF UNCERTAIN BEHAVIOR OF SKIN: Status: ACTIVE | Noted: 2017-07-11

## 2017-07-11 PROCEDURE — ? XTRAC LASER

## 2017-07-11 PROCEDURE — 96920 EXCIMER LSR PSRIASIS<250SQCM: CPT

## 2017-07-11 PROCEDURE — 99211 OFF/OP EST MAY X REQ PHY/QHP: CPT | Mod: 25

## 2017-07-11 ASSESSMENT — LOCATION DETAILED DESCRIPTION DERM
LOCATION DETAILED: LEFT ELBOW
LOCATION DETAILED: LEFT OCCIPITAL SCALP
LOCATION DETAILED: LEFT OCCIPITAL SCALP
LOCATION DETAILED: LEFT ELBOW
LOCATION DETAILED: RIGHT SUPERIOR OCCIPITAL SCALP
LOCATION DETAILED: RIGHT SUPERIOR OCCIPITAL SCALP

## 2017-07-11 ASSESSMENT — LOCATION ZONE DERM
LOCATION ZONE: SCALP
LOCATION ZONE: SCALP
LOCATION ZONE: ARM
LOCATION ZONE: ARM

## 2017-07-11 NOTE — PROCEDURE: XTRAC LASER
Location #1: scalp
% Increase/Decrease From Last Treatment: 10%
Dose Setting #1 (Mj/Cm2): 1985
Detail Level: Detailed
Post-Care Instructions: I reviewed with the patient in detail post-care instructions. Patient should stay away from the sun and wear sun protection until treated areas are fully healed.
Dose Setting #2 (Mj/Cm2): 1250
Total Square Area In Cm2 (Required For Proper Billing): 102
Treatment Number: 28
Total Pulses (Will Not Render If 0): 0
Consent: Written consent obtained, risks reviewed including but not limited to crusting, scabbing, blistering, scarring, darker or lighter pigmentary change, incidental hair removal, bruising, and/or incomplete removal.
Location #2: Left Elbow

## 2017-07-11 NOTE — PROCEDURE: XTRAC LASER
% Increase/Decrease From Last Treatment: 10%
Treatment Number: 28
Dose Setting #1 (Mj/Cm2): 9066
Consent: Written consent obtained, risks reviewed including but not limited to crusting, scabbing, blistering, scarring, darker or lighter pigmentary change, incidental hair removal, bruising, and/or incomplete removal.
Location #1: scalp
Post-Care Instructions: I reviewed with the patient in detail post-care instructions. Patient should stay away from the sun and wear sun protection until treated areas are fully healed.
Total Pulses (Will Not Render If 0): 0
Dose Setting #2 (Mj/Cm2): 1250
Detail Level: Detailed
Total Square Area In Cm2 (Required For Proper Billing): 102
Location #2: Left Elbow

## 2017-07-14 ENCOUNTER — APPOINTMENT (RX ONLY)
Dept: URBAN - METROPOLITAN AREA CLINIC 71 | Facility: CLINIC | Age: 27
Setting detail: DERMATOLOGY
End: 2017-07-14

## 2017-07-14 ENCOUNTER — APPOINTMENT (RX ONLY)
Dept: URBAN - METROPOLITAN AREA CLINIC 70 | Facility: CLINIC | Age: 27
Setting detail: DERMATOLOGY
End: 2017-07-14

## 2017-07-14 DIAGNOSIS — L40.0 PSORIASIS VULGARIS: ICD-10-CM

## 2017-07-14 PROCEDURE — ? XTRAC LASER

## 2017-07-14 PROCEDURE — 96920 EXCIMER LSR PSRIASIS<250SQCM: CPT

## 2017-07-14 PROCEDURE — 99211 OFF/OP EST MAY X REQ PHY/QHP: CPT | Mod: 25

## 2017-07-14 ASSESSMENT — LOCATION SIMPLE DESCRIPTION DERM
LOCATION SIMPLE: LEFT ELBOW
LOCATION SIMPLE: POSTERIOR SCALP
LOCATION SIMPLE: LEFT ELBOW

## 2017-07-14 ASSESSMENT — LOCATION ZONE DERM
LOCATION ZONE: SCALP
LOCATION ZONE: ARM
LOCATION ZONE: SCALP
LOCATION ZONE: ARM
LOCATION ZONE: SCALP
LOCATION ZONE: SCALP

## 2017-07-14 ASSESSMENT — LOCATION DETAILED DESCRIPTION DERM
LOCATION DETAILED: RIGHT SUPERIOR OCCIPITAL SCALP
LOCATION DETAILED: LEFT OCCIPITAL SCALP
LOCATION DETAILED: LEFT ELBOW
LOCATION DETAILED: RIGHT SUPERIOR OCCIPITAL SCALP
LOCATION DETAILED: LEFT OCCIPITAL SCALP
LOCATION DETAILED: LEFT ELBOW
LOCATION DETAILED: LEFT ELBOW
LOCATION DETAILED: LEFT OCCIPITAL SCALP
LOCATION DETAILED: LEFT ELBOW
LOCATION DETAILED: RIGHT SUPERIOR OCCIPITAL SCALP
LOCATION DETAILED: RIGHT SUPERIOR OCCIPITAL SCALP
LOCATION DETAILED: LEFT OCCIPITAL SCALP

## 2017-07-14 NOTE — PROCEDURE: XTRAC LASER
% Increase/Decrease From Last Treatment: 10%
Detail Level: Detailed
Total Square Area In Cm2 (Required For Proper Billing): 102
Consent: Written consent obtained, risks reviewed including but not limited to crusting, scabbing, blistering, scarring, darker or lighter pigmentary change, incidental hair removal, bruising, and/or incomplete removal.
Post-Care Instructions: I reviewed with the patient in detail post-care instructions. Patient should stay away from the sun and wear sun protection until treated areas are fully healed.
Location #2: Left Elbow
Treatment Number: 28
Dose Setting #2 (Mj/Cm2): 1250
Dose Setting #1 (Mj/Cm2): 0377
Total Pulses (Will Not Render If 0): 0
Location #1: scalp

## 2017-07-14 NOTE — PROCEDURE: XTRAC LASER
Total Square Area In Cm2 (Required For Proper Billing): 102
Treatment Number: 28
Dose Setting #1 (Mj/Cm2): 2573
Consent: Written consent obtained, risks reviewed including but not limited to crusting, scabbing, blistering, scarring, darker or lighter pigmentary change, incidental hair removal, bruising, and/or incomplete removal.
% Increase/Decrease From Last Treatment: 10%
Total Pulses (Will Not Render If 0): 0
Location #1: scalp
Detail Level: Detailed
Location #2: Left Elbow
Post-Care Instructions: I reviewed with the patient in detail post-care instructions. Patient should stay away from the sun and wear sun protection until treated areas are fully healed.
Dose Setting #2 (Mj/Cm2): 1250

## 2017-07-14 NOTE — PROCEDURE: XTRAC LASER
Location #1: scalp
Treatment Number: 29
Location #2: Left Elbow
Comments: Patient denies any tenderness or pain from last treatment. Increasing by 10% today. BS
Total Square Area In Cm2 (Required For Proper Billing): 85
Total Pulses (Will Not Render If 0): 0
% Increase/Decrease From Last Treatment: 10%
Consent: Written consent obtained, risks reviewed including but not limited to crusting, scabbing, blistering, scarring, darker or lighter pigmentary change, incidental hair removal, bruising, and/or incomplete removal.
Post-Care Instructions: I reviewed with the patient in detail post-care instructions. Patient should stay away from the sun and wear sun protection until treated areas are fully healed.
Detail Level: Detailed
Dose Setting #1 (Mj/Cm2): 0442
Dose Setting #2 (Mj/Cm2): 0530

## 2017-07-14 NOTE — PROCEDURE: XTRAC LASER
Dose Setting #2 (Mj/Cm2): 6685
Treatment Number: 29
Total Pulses (Will Not Render If 0): 0
Consent: Written consent obtained, risks reviewed including but not limited to crusting, scabbing, blistering, scarring, darker or lighter pigmentary change, incidental hair removal, bruising, and/or incomplete removal.
Detail Level: Detailed
Location #1: scalp
Comments: Patient denies any tenderness or pain from last treatment. Increasing by 10% today. BS
Location #2: Left Elbow
% Increase/Decrease From Last Treatment: 10%
Dose Setting #1 (Mj/Cm2): 7331
Total Square Area In Cm2 (Required For Proper Billing): 85
Post-Care Instructions: I reviewed with the patient in detail post-care instructions. Patient should stay away from the sun and wear sun protection until treated areas are fully healed.

## 2017-07-17 ENCOUNTER — APPOINTMENT (RX ONLY)
Dept: URBAN - METROPOLITAN AREA CLINIC 71 | Facility: CLINIC | Age: 27
Setting detail: DERMATOLOGY
End: 2017-07-17

## 2017-07-17 ENCOUNTER — APPOINTMENT (RX ONLY)
Dept: URBAN - METROPOLITAN AREA CLINIC 70 | Facility: CLINIC | Age: 27
Setting detail: DERMATOLOGY
End: 2017-07-17

## 2017-07-17 DIAGNOSIS — L40.0 PSORIASIS VULGARIS: ICD-10-CM

## 2017-07-17 PROCEDURE — 96920 EXCIMER LSR PSRIASIS<250SQCM: CPT

## 2017-07-17 PROCEDURE — 99211 OFF/OP EST MAY X REQ PHY/QHP: CPT | Mod: 25

## 2017-07-17 PROCEDURE — ? XTRAC LASER

## 2017-07-17 ASSESSMENT — LOCATION DETAILED DESCRIPTION DERM
LOCATION DETAILED: RIGHT SUPERIOR OCCIPITAL SCALP
LOCATION DETAILED: RIGHT SUPERIOR OCCIPITAL SCALP
LOCATION DETAILED: LEFT OCCIPITAL SCALP
LOCATION DETAILED: LEFT ELBOW
LOCATION DETAILED: LEFT OCCIPITAL SCALP
LOCATION DETAILED: LEFT ELBOW

## 2017-07-17 ASSESSMENT — LOCATION ZONE DERM
LOCATION ZONE: ARM
LOCATION ZONE: SCALP
LOCATION ZONE: ARM
LOCATION ZONE: SCALP

## 2017-07-17 NOTE — PROCEDURE: XTRAC LASER
% Increase/Decrease From Last Treatment: 7-8%
Comments: Patient denies any tenderness or pain from last treatment. Increasing by 10% today. BS
Location #2: Left Elbow
Total Pulses (Will Not Render If 0): 0
Post-Care Instructions: I reviewed with the patient in detail post-care instructions. Patient should stay away from the sun and wear sun protection until treated areas are fully healed.
Detail Level: Detailed
Dose Setting #1 (Mj/Cm2): 7729
Consent: Written consent obtained, risks reviewed including but not limited to crusting, scabbing, blistering, scarring, darker or lighter pigmentary change, incidental hair removal, bruising, and/or incomplete removal.
Location #1: scalp
Total Square Area In Cm2 (Required For Proper Billing): 99
Dose Setting #2 (Mj/Cm2): 8980
Treatment Number: 30

## 2017-07-17 NOTE — PROCEDURE: XTRAC LASER
Consent: Written consent obtained, risks reviewed including but not limited to crusting, scabbing, blistering, scarring, darker or lighter pigmentary change, incidental hair removal, bruising, and/or incomplete removal.
Post-Care Instructions: I reviewed with the patient in detail post-care instructions. Patient should stay away from the sun and wear sun protection until treated areas are fully healed.
% Increase/Decrease From Last Treatment: 7-8%
Dose Setting #2 (Mj/Cm2): 3072
Location #2: Left Elbow
Location #1: scalp
Detail Level: Detailed
Treatment Number: 30
Total Square Area In Cm2 (Required For Proper Billing): 99
Comments: Patient denies any tenderness or pain from last treatment. Increasing by 10% today. BS
Total Pulses (Will Not Render If 0): 0
Dose Setting #1 (Mj/Cm2): 7447

## 2017-07-21 ENCOUNTER — APPOINTMENT (RX ONLY)
Dept: URBAN - METROPOLITAN AREA CLINIC 70 | Facility: CLINIC | Age: 27
Setting detail: DERMATOLOGY
End: 2017-07-21

## 2017-07-21 ENCOUNTER — APPOINTMENT (RX ONLY)
Dept: URBAN - METROPOLITAN AREA CLINIC 71 | Facility: CLINIC | Age: 27
Setting detail: DERMATOLOGY
End: 2017-07-21

## 2017-07-21 DIAGNOSIS — L40.0 PSORIASIS VULGARIS: ICD-10-CM

## 2017-07-21 PROCEDURE — ? XTRAC LASER

## 2017-07-21 PROCEDURE — 99211 OFF/OP EST MAY X REQ PHY/QHP: CPT | Mod: 25

## 2017-07-21 PROCEDURE — 96920 EXCIMER LSR PSRIASIS<250SQCM: CPT

## 2017-07-21 ASSESSMENT — LOCATION ZONE DERM
LOCATION ZONE: SCALP
LOCATION ZONE: SCALP
LOCATION ZONE: ARM
LOCATION ZONE: ARM

## 2017-07-21 ASSESSMENT — LOCATION DETAILED DESCRIPTION DERM
LOCATION DETAILED: LEFT OCCIPITAL SCALP
LOCATION DETAILED: LEFT ELBOW
LOCATION DETAILED: RIGHT SUPERIOR OCCIPITAL SCALP
LOCATION DETAILED: LEFT ELBOW
LOCATION DETAILED: LEFT OCCIPITAL SCALP
LOCATION DETAILED: RIGHT SUPERIOR OCCIPITAL SCALP

## 2017-07-21 NOTE — PROCEDURE: XTRAC LASER
Total Square Area In Cm2 (Required For Proper Billing): 99
Location #2: Left Elbow
Treatment Number: 31
Total Pulses (Will Not Render If 0): 0
Dose Setting #2 (Mj/Cm2): 1362
% Increase/Decrease From Last Treatment: 10%
Detail Level: Detailed
Dose Setting #1 (Mj/Cm2): 1033
Location #1: scalp
Comments: Patient denies any tenderness or pain from last treatment. Increasing by 10% today. 
Post-Care Instructions: I reviewed with the patient in detail post-care instructions. Patient should stay away from the sun and wear sun protection until treated areas are fully healed.
Consent: Written consent obtained, risks reviewed including but not limited to crusting, scabbing, blistering, scarring, darker or lighter pigmentary change, incidental hair removal, bruising, and/or incomplete removal.

## 2017-07-28 ENCOUNTER — APPOINTMENT (RX ONLY)
Dept: URBAN - METROPOLITAN AREA CLINIC 70 | Facility: CLINIC | Age: 27
Setting detail: DERMATOLOGY
End: 2017-07-28

## 2017-07-28 ENCOUNTER — APPOINTMENT (RX ONLY)
Dept: URBAN - METROPOLITAN AREA CLINIC 71 | Facility: CLINIC | Age: 27
Setting detail: DERMATOLOGY
End: 2017-07-28

## 2017-07-28 DIAGNOSIS — Z71.89 OTHER SPECIFIED COUNSELING: ICD-10-CM

## 2017-07-28 DIAGNOSIS — L40.0 PSORIASIS VULGARIS: ICD-10-CM

## 2017-07-28 DIAGNOSIS — L40.0 PSORIASIS VULGARIS: ICD-10-CM | Status: IMPROVED

## 2017-07-28 PROBLEM — D48.5 NEOPLASM OF UNCERTAIN BEHAVIOR OF SKIN: Status: ACTIVE | Noted: 2017-07-28

## 2017-07-28 PROBLEM — L29.8 OTHER PRURITUS: Status: ACTIVE | Noted: 2017-07-28

## 2017-07-28 PROCEDURE — ? COUNSELING

## 2017-07-28 PROCEDURE — 99213 OFFICE O/P EST LOW 20 MIN: CPT

## 2017-07-28 PROCEDURE — ? TREATMENT REGIMEN

## 2017-07-28 PROCEDURE — 96920 EXCIMER LSR PSRIASIS<250SQCM: CPT

## 2017-07-28 PROCEDURE — ? XTRAC LASER

## 2017-07-28 ASSESSMENT — LOCATION SIMPLE DESCRIPTION DERM
LOCATION SIMPLE: POSTERIOR SCALP
LOCATION SIMPLE: LEFT OCCIPITAL SCALP
LOCATION SIMPLE: LEFT ELBOW
LOCATION SIMPLE: SCALP
LOCATION SIMPLE: LEFT ELBOW
LOCATION SIMPLE: LEFT OCCIPITAL SCALP
LOCATION SIMPLE: POSTERIOR SCALP
LOCATION SIMPLE: SCALP
LOCATION SIMPLE: LEFT ELBOW
LOCATION SIMPLE: LEFT ELBOW

## 2017-07-28 ASSESSMENT — LOCATION DETAILED DESCRIPTION DERM
LOCATION DETAILED: LEFT SUPERIOR PARIETAL SCALP
LOCATION DETAILED: LEFT ELBOW
LOCATION DETAILED: LEFT CENTRAL PARIETAL SCALP
LOCATION DETAILED: LEFT ELBOW
LOCATION DETAILED: LEFT SUPERIOR PARIETAL SCALP
LOCATION DETAILED: LEFT OCCIPITAL SCALP
LOCATION DETAILED: LEFT ELBOW
LOCATION DETAILED: LEFT OCCIPITAL SCALP
LOCATION DETAILED: RIGHT SUPERIOR OCCIPITAL SCALP
LOCATION DETAILED: LEFT ELBOW
LOCATION DETAILED: RIGHT CENTRAL PARIETAL SCALP
LOCATION DETAILED: LEFT SUPERIOR OCCIPITAL SCALP
LOCATION DETAILED: LEFT CENTRAL PARIETAL SCALP
LOCATION DETAILED: RIGHT CENTRAL PARIETAL SCALP
LOCATION DETAILED: LEFT SUPERIOR OCCIPITAL SCALP
LOCATION DETAILED: RIGHT SUPERIOR OCCIPITAL SCALP

## 2017-07-28 ASSESSMENT — LOCATION ZONE DERM
LOCATION ZONE: ARM
LOCATION ZONE: SCALP
LOCATION ZONE: SCALP
LOCATION ZONE: ARM
LOCATION ZONE: ARM
LOCATION ZONE: SCALP
LOCATION ZONE: ARM
LOCATION ZONE: SCALP

## 2017-07-28 NOTE — PROCEDURE: TREATMENT REGIMEN
Action 3: Continue
Action 1: Hold
Plan: Reviewed etiology and treatment options. Pt has done well with Xtract, improved overall. Discussed taking a break from the laser due to increase in pts work schedule. She can continue treatments at anytime
Continue Regimen: Clobex Spray PRN\\nP&S shampoo QOD\\nEnstilar
Sig For Treatment 1 (If Needed): twice weekly
Increase/Decrease Free Form Instructions: Improved, will hold therapy for now due to pt upcomning busy Aug schedule
Treatment 1: NBUVB
Detail Level: Zone
Discontinue Regimen: Hold xtract for now. Improved with laser. Busy Aug schedule. Will maintain with topicals

## 2017-07-28 NOTE — PROCEDURE: XTRAC LASER
Location #1: scalp
Total Square Area In Cm2 (Required For Proper Billing): 82
Detail Level: Detailed
Dose Setting #2 (Mj/Cm2): 8563
% Increase/Decrease From Last Treatment: repeat previous dose
Treatment Number: 32
Consent: Written consent obtained, risks reviewed including but not limited to crusting, scabbing, blistering, scarring, darker or lighter pigmentary change, incidental hair removal, bruising, and/or incomplete removal.
Total Pulses (Will Not Render If 0): 0
Post-Care Instructions: I reviewed with the patient in detail post-care instructions. Patient should stay away from the sun and wear sun protection until treated areas are fully healed.
Comments: Patient denies any tenderness or pain from last treatment. Repeat same dose. Ed
Location #2: Left Elbow

## 2017-07-28 NOTE — PROCEDURE: XTRAC LASER
Detail Level: Detailed
Location #1: scalp
Dose Setting #2 (Mj/Cm2): 8371
Total Pulses (Will Not Render If 0): 0
Total Square Area In Cm2 (Required For Proper Billing): 82
Treatment Number: 32
Comments: Patient denies any tenderness or pain from last treatment. Repeat same dose. Ed
Location #2: Left Elbow
% Increase/Decrease From Last Treatment: repeat previous dose
Post-Care Instructions: I reviewed with the patient in detail post-care instructions. Patient should stay away from the sun and wear sun protection until treated areas are fully healed.
Consent: Written consent obtained, risks reviewed including but not limited to crusting, scabbing, blistering, scarring, darker or lighter pigmentary change, incidental hair removal, bruising, and/or incomplete removal.

## 2017-08-22 NOTE — PROCEDURE: XTRAC LASER
Total Pulses (Will Not Render If 0): 0
Location #1: scalp
Post-Care Instructions: I reviewed with the patient in detail post-care instructions. Patient should stay away from the sun and wear sun protection until treated areas are fully healed.
Total Square Area In Cm2 (Required For Proper Billing): 99
Treatment Number: 31
Detail Level: Detailed
Comments: Patient denies any tenderness or pain from last treatment. Increasing by 10% today. 
Dose Setting #1 (Mj/Cm2): 2415
Consent: Written consent obtained, risks reviewed including but not limited to crusting, scabbing, blistering, scarring, darker or lighter pigmentary change, incidental hair removal, bruising, and/or incomplete removal.
Location #2: Left Elbow
Dose Setting #2 (Mj/Cm2): 3406
% Increase/Decrease From Last Treatment: 10%
Wheel Chair/o2 2l cont...

## 2019-02-13 ENCOUNTER — APPOINTMENT (RX ONLY)
Dept: URBAN - METROPOLITAN AREA CLINIC 71 | Facility: CLINIC | Age: 29
Setting detail: DERMATOLOGY
End: 2019-02-13

## 2019-02-13 ENCOUNTER — RX ONLY (OUTPATIENT)
Age: 29
Setting detail: RX ONLY
End: 2019-02-13

## 2019-02-13 ENCOUNTER — APPOINTMENT (RX ONLY)
Dept: URBAN - METROPOLITAN AREA CLINIC 70 | Facility: CLINIC | Age: 29
Setting detail: DERMATOLOGY
End: 2019-02-13

## 2019-02-13 DIAGNOSIS — L85.3 XEROSIS CUTIS: ICD-10-CM

## 2019-02-13 DIAGNOSIS — Z41.9 ENCOUNTER FOR PROCEDURE FOR PURPOSES OTHER THAN REMEDYING HEALTH STATE, UNSPECIFIED: ICD-10-CM

## 2019-02-13 DIAGNOSIS — L40.0 PSORIASIS VULGARIS: ICD-10-CM

## 2019-02-13 DIAGNOSIS — Z71.89 OTHER SPECIFIED COUNSELING: ICD-10-CM

## 2019-02-13 PROBLEM — D48.5 NEOPLASM OF UNCERTAIN BEHAVIOR OF SKIN: Status: ACTIVE | Noted: 2019-02-13

## 2019-02-13 PROCEDURE — ? COUNSELING

## 2019-02-13 PROCEDURE — 99213 OFFICE O/P EST LOW 20 MIN: CPT

## 2019-02-13 PROCEDURE — ? COSMETIC CONSULTATION: BOTULINUM TOXIN

## 2019-02-13 PROCEDURE — ? TREATMENT REGIMEN

## 2019-02-13 PROCEDURE — ? COSMETIC CONSULTATION: SKIN CARE PRODUCTS AND SERVICES

## 2019-02-13 RX ORDER — CLOBETASOL PROPIONATE 0.05 G/ML
SPRAY TOPICAL
Qty: 1 | Refills: 3 | Status: ERX

## 2019-02-13 ASSESSMENT — LOCATION DETAILED DESCRIPTION DERM
LOCATION DETAILED: INFERIOR MID FOREHEAD
LOCATION DETAILED: RIGHT CENTRAL MALAR CHEEK
LOCATION DETAILED: LEFT LOWER CUTANEOUS LIP
LOCATION DETAILED: LEFT CHIN
LOCATION DETAILED: LEFT INFERIOR CENTRAL MALAR CHEEK
LOCATION DETAILED: LEFT SUPERIOR PARIETAL SCALP
LOCATION DETAILED: LEFT CHIN
LOCATION DETAILED: LEFT MEDIAL FOREHEAD
LOCATION DETAILED: LEFT INFERIOR CENTRAL MALAR CHEEK
LOCATION DETAILED: INFERIOR MID FOREHEAD
LOCATION DETAILED: LEFT SUPERIOR PARIETAL SCALP
LOCATION DETAILED: RIGHT CENTRAL MALAR CHEEK
LOCATION DETAILED: LEFT LOWER CUTANEOUS LIP
LOCATION DETAILED: LEFT MEDIAL FOREHEAD
LOCATION DETAILED: HAIR
LOCATION DETAILED: HAIR

## 2019-02-13 ASSESSMENT — LOCATION SIMPLE DESCRIPTION DERM
LOCATION SIMPLE: SCALP
LOCATION SIMPLE: CHIN
LOCATION SIMPLE: INFERIOR FOREHEAD
LOCATION SIMPLE: LEFT FOREHEAD
LOCATION SIMPLE: LEFT FOREHEAD
LOCATION SIMPLE: LEFT LIP
LOCATION SIMPLE: SCALP
LOCATION SIMPLE: RIGHT CHEEK
LOCATION SIMPLE: HAIR
LOCATION SIMPLE: HAIR
LOCATION SIMPLE: RIGHT CHEEK
LOCATION SIMPLE: INFERIOR FOREHEAD
LOCATION SIMPLE: CHIN
LOCATION SIMPLE: LEFT LIP
LOCATION SIMPLE: LEFT CHEEK
LOCATION SIMPLE: LEFT CHEEK

## 2019-02-13 ASSESSMENT — LOCATION ZONE DERM
LOCATION ZONE: FACE
LOCATION ZONE: LIP
LOCATION ZONE: LIP
LOCATION ZONE: SCALP
LOCATION ZONE: FACE
LOCATION ZONE: SCALP

## 2019-02-13 NOTE — PROCEDURE: TREATMENT REGIMEN
Initiate Regimen: Clobex \\nPA for Laser \\nKertyol PSO
Action 4: Continue
Detail Level: Simple
Continue Regimen: PA Xtract
Plan: Reviewed findings. did well with xtract in the past. Pt is breast feeding. She would like to restart laser treatments. Topicals are not controlling her psoriasis as this time. Would like to avoid systemic therapy as well due to breast feeding. BSA 5%.

## 2019-02-13 NOTE — PROCEDURE: TREATMENT REGIMEN
Action 1: Continue
Initiate Regimen: Clobex \\nPA for Laser \\nKertyol PSO
Continue Regimen: PA Xtract
Plan: Reviewed findings. did well with xtract in the past. Pt is breast feeding. She would like to restart laser treatments. Topicals are not controlling her psoriasis as this time. Would like to avoid systemic therapy as well due to breast feeding. BSA 5%.
Detail Level: Simple

## 2019-03-18 ENCOUNTER — APPOINTMENT (RX ONLY)
Dept: URBAN - METROPOLITAN AREA CLINIC 71 | Facility: CLINIC | Age: 29
Setting detail: DERMATOLOGY
End: 2019-03-18

## 2019-03-18 ENCOUNTER — APPOINTMENT (RX ONLY)
Dept: URBAN - METROPOLITAN AREA CLINIC 70 | Facility: CLINIC | Age: 29
Setting detail: DERMATOLOGY
End: 2019-03-18

## 2019-03-18 DIAGNOSIS — L40.0 PSORIASIS VULGARIS: ICD-10-CM

## 2019-03-18 PROBLEM — L29.8 OTHER PRURITUS: Status: ACTIVE | Noted: 2019-03-18

## 2019-03-18 PROCEDURE — 99211 OFF/OP EST MAY X REQ PHY/QHP: CPT | Mod: 25

## 2019-03-18 PROCEDURE — 96920 EXCIMER LSR PSRIASIS<250SQCM: CPT

## 2019-03-18 PROCEDURE — ? XTRAC LASER

## 2019-03-18 ASSESSMENT — LOCATION DETAILED DESCRIPTION DERM
LOCATION DETAILED: LEFT ELBOW
LOCATION DETAILED: LEFT SUPERIOR OCCIPITAL SCALP
LOCATION DETAILED: RIGHT ELBOW
LOCATION DETAILED: RIGHT SUPERIOR OCCIPITAL SCALP
LOCATION DETAILED: LEFT ELBOW
LOCATION DETAILED: RIGHT INFERIOR PARIETAL SCALP
LOCATION DETAILED: RIGHT INFERIOR OCCIPITAL SCALP
LOCATION DETAILED: RIGHT OCCIPITAL SCALP
LOCATION DETAILED: MID-OCCIPITAL SCALP
LOCATION DETAILED: RIGHT ELBOW
LOCATION DETAILED: LEFT SUPERIOR POSTAURICULAR SKIN
LOCATION DETAILED: RIGHT CENTRAL FRONTAL SCALP
LOCATION DETAILED: RIGHT CENTRAL FRONTAL SCALP
LOCATION DETAILED: RIGHT CENTRAL PARIETAL SCALP
LOCATION DETAILED: RIGHT INFERIOR OCCIPITAL SCALP
LOCATION DETAILED: LEFT SUPERIOR POSTAURICULAR SKIN
LOCATION DETAILED: LEFT SUPERIOR LATERAL FOREHEAD
LOCATION DETAILED: MID-OCCIPITAL SCALP
LOCATION DETAILED: RIGHT SUPERIOR OCCIPITAL SCALP
LOCATION DETAILED: LEFT INFERIOR OCCIPITAL SCALP
LOCATION DETAILED: LEFT INFERIOR OCCIPITAL SCALP
LOCATION DETAILED: LEFT INFERIOR PARIETAL SCALP
LOCATION DETAILED: LEFT SUPERIOR LATERAL FOREHEAD
LOCATION DETAILED: LEFT OCCIPITAL SCALP
LOCATION DETAILED: LEFT SUPERIOR OCCIPITAL SCALP
LOCATION DETAILED: RIGHT INFERIOR PARIETAL SCALP
LOCATION DETAILED: LEFT FOREHEAD
LOCATION DETAILED: RIGHT OCCIPITAL SCALP
LOCATION DETAILED: RIGHT SUPERIOR POSTERIOR NECK
LOCATION DETAILED: LEFT POSTAURICULAR SKIN
LOCATION DETAILED: LEFT FOREHEAD
LOCATION DETAILED: RIGHT CENTRAL PARIETAL SCALP
LOCATION DETAILED: LEFT POSTAURICULAR SKIN
LOCATION DETAILED: LEFT OCCIPITAL SCALP
LOCATION DETAILED: RIGHT SUPERIOR POSTERIOR NECK
LOCATION DETAILED: LEFT INFERIOR PARIETAL SCALP

## 2019-03-18 ASSESSMENT — LOCATION ZONE DERM
LOCATION ZONE: NECK
LOCATION ZONE: ARM
LOCATION ZONE: NECK
LOCATION ZONE: SCALP
LOCATION ZONE: FACE
LOCATION ZONE: SCALP
LOCATION ZONE: FACE
LOCATION ZONE: ARM

## 2019-03-18 ASSESSMENT — LOCATION SIMPLE DESCRIPTION DERM
LOCATION SIMPLE: POSTERIOR NECK
LOCATION SIMPLE: POSTERIOR SCALP
LOCATION SIMPLE: LEFT FOREHEAD
LOCATION SIMPLE: RIGHT ELBOW
LOCATION SIMPLE: SCALP
LOCATION SIMPLE: LEFT ELBOW
LOCATION SIMPLE: RIGHT SCALP
LOCATION SIMPLE: RIGHT SCALP
LOCATION SIMPLE: POSTERIOR NECK
LOCATION SIMPLE: LEFT FOREHEAD
LOCATION SIMPLE: POSTERIOR SCALP
LOCATION SIMPLE: SCALP
LOCATION SIMPLE: LEFT ELBOW
LOCATION SIMPLE: RIGHT ELBOW

## 2019-03-18 NOTE — PROCEDURE: XTRAC LASER
Total Square Area In Cm2 (Required For Proper Billing): 128
Location #2: elbows
Total Pulses (Will Not Render If 0): 0
Detail Level: Detailed
Treatment Number: 1
Total Energy In Joules: 3.60
Location #1: scalp
Dose Setting #2 (Mj/Cm2): 300
Post-Care Instructions: I reviewed with the patient in detail post-care instructions. Patient should stay away from the sun and wear sun protection until treated areas are fully healed.
Consent: Written consent obtained, risks reviewed including but not limited to crusting, scabbing, blistering, scarring, darker or lighter pigmentary change, incidental hair removal, bruising, and/or incomplete removal.

## 2019-03-18 NOTE — PROCEDURE: XTRAC LASER
Consent: Written consent obtained, risks reviewed including but not limited to crusting, scabbing, blistering, scarring, darker or lighter pigmentary change, incidental hair removal, bruising, and/or incomplete removal.
Total Pulses (Will Not Render If 0): 0
Dose Setting #2 (Mj/Cm2): 300
Detail Level: Detailed
Treatment Number: 1
Post-Care Instructions: I reviewed with the patient in detail post-care instructions. Patient should stay away from the sun and wear sun protection until treated areas are fully healed.
Total Energy In Joules: 3.60
Location #1: scalp
Total Square Area In Cm2 (Required For Proper Billing): 128
Location #2: elbows

## 2019-03-21 ENCOUNTER — APPOINTMENT (RX ONLY)
Dept: URBAN - METROPOLITAN AREA CLINIC 71 | Facility: CLINIC | Age: 29
Setting detail: DERMATOLOGY
End: 2019-03-21

## 2019-03-21 ENCOUNTER — APPOINTMENT (RX ONLY)
Dept: URBAN - METROPOLITAN AREA CLINIC 70 | Facility: CLINIC | Age: 29
Setting detail: DERMATOLOGY
End: 2019-03-21

## 2019-03-21 DIAGNOSIS — L40.0 PSORIASIS VULGARIS: ICD-10-CM

## 2019-03-21 PROCEDURE — 99211 OFF/OP EST MAY X REQ PHY/QHP: CPT | Mod: 25

## 2019-03-21 PROCEDURE — 96920 EXCIMER LSR PSRIASIS<250SQCM: CPT

## 2019-03-21 PROCEDURE — ? XTRAC LASER

## 2019-03-21 ASSESSMENT — LOCATION SIMPLE DESCRIPTION DERM
LOCATION SIMPLE: RIGHT ELBOW
LOCATION SIMPLE: LEFT FOREHEAD
LOCATION SIMPLE: LEFT ELBOW
LOCATION SIMPLE: LEFT FOREHEAD
LOCATION SIMPLE: LEFT ELBOW
LOCATION SIMPLE: RIGHT ELBOW
LOCATION SIMPLE: RIGHT SCALP
LOCATION SIMPLE: RIGHT SCALP

## 2019-03-21 ASSESSMENT — LOCATION DETAILED DESCRIPTION DERM
LOCATION DETAILED: RIGHT ELBOW
LOCATION DETAILED: RIGHT CENTRAL FRONTAL SCALP
LOCATION DETAILED: RIGHT CENTRAL FRONTAL SCALP
LOCATION DETAILED: LEFT ELBOW
LOCATION DETAILED: RIGHT ELBOW
LOCATION DETAILED: LEFT FOREHEAD
LOCATION DETAILED: LEFT FOREHEAD
LOCATION DETAILED: LEFT ELBOW

## 2019-03-21 ASSESSMENT — LOCATION ZONE DERM
LOCATION ZONE: SCALP
LOCATION ZONE: ARM
LOCATION ZONE: FACE
LOCATION ZONE: SCALP
LOCATION ZONE: FACE
LOCATION ZONE: ARM

## 2019-03-21 NOTE — PROCEDURE: XTRAC LASER
Total Pulses (Will Not Render If 0): 0
Consent: Written consent obtained, risks reviewed including but not limited to crusting, scabbing, blistering, scarring, darker or lighter pigmentary change, incidental hair removal, bruising, and/or incomplete removal.
Dose Setting #2 (Mj/Cm2): 375
Post-Care Instructions: I reviewed with the patient in detail post-care instructions. Patient should stay away from the sun and wear sun protection until treated areas are fully healed.
Location #1: scalp
Location #2: elbows
Total Square Area In Cm2 (Required For Proper Billing): 196
Total Energy In Joules: 73.50
% Increase/Decrease From Last Treatment: 25
Detail Level: Detailed
Treatment Number: 2

## 2019-03-21 NOTE — PROCEDURE: XTRAC LASER
Dose Setting #2 (Mj/Cm2): 375
Total Energy In Joules: 73.50
Treatment Number: 2
Post-Care Instructions: I reviewed with the patient in detail post-care instructions. Patient should stay away from the sun and wear sun protection until treated areas are fully healed.
Location #2: elbows
Location #1: scalp
Total Pulses (Will Not Render If 0): 0
% Increase/Decrease From Last Treatment: 25
Detail Level: Detailed
Consent: Written consent obtained, risks reviewed including but not limited to crusting, scabbing, blistering, scarring, darker or lighter pigmentary change, incidental hair removal, bruising, and/or incomplete removal.
Total Square Area In Cm2 (Required For Proper Billing): 196

## 2019-03-25 ENCOUNTER — APPOINTMENT (RX ONLY)
Dept: URBAN - METROPOLITAN AREA CLINIC 70 | Facility: CLINIC | Age: 29
Setting detail: DERMATOLOGY
End: 2019-03-25

## 2019-03-25 ENCOUNTER — APPOINTMENT (RX ONLY)
Dept: URBAN - METROPOLITAN AREA CLINIC 71 | Facility: CLINIC | Age: 29
Setting detail: DERMATOLOGY
End: 2019-03-25

## 2019-03-25 DIAGNOSIS — L40.0 PSORIASIS VULGARIS: ICD-10-CM

## 2019-03-25 PROCEDURE — 96920 EXCIMER LSR PSRIASIS<250SQCM: CPT

## 2019-03-25 PROCEDURE — ? XTRAC LASER

## 2019-03-25 PROCEDURE — 99211 OFF/OP EST MAY X REQ PHY/QHP: CPT | Mod: 25

## 2019-03-25 ASSESSMENT — LOCATION ZONE DERM
LOCATION ZONE: ARM
LOCATION ZONE: ARM
LOCATION ZONE: SCALP
LOCATION ZONE: SCALP

## 2019-03-25 ASSESSMENT — LOCATION DETAILED DESCRIPTION DERM
LOCATION DETAILED: RIGHT SUPERIOR PARIETAL SCALP
LOCATION DETAILED: RIGHT SUPERIOR PARIETAL SCALP
LOCATION DETAILED: RIGHT ELBOW
LOCATION DETAILED: RIGHT ELBOW

## 2019-03-25 ASSESSMENT — LOCATION SIMPLE DESCRIPTION DERM
LOCATION SIMPLE: SCALP
LOCATION SIMPLE: RIGHT ELBOW
LOCATION SIMPLE: RIGHT ELBOW
LOCATION SIMPLE: SCALP

## 2019-03-25 NOTE — PROCEDURE: XTRAC LASER
Location #1: scalp
% Increase/Decrease From Last Treatment: 25
Detail Level: Simple
Location #2: right elbow
Dose Setting #2 (Mj/Cm2): 465
Total Energy In Joules: 136
Patient Id: 
Total Square Area In Cm2 (Required For Proper Billing): 63.78
Treatment Number: 3
Total Pulses (Will Not Render If 0): 0
Consent: Written consent obtained, risks reviewed including but not limited to crusting, scabbing, blistering, scarring, darker or lighter pigmentary change, incidental hair removal, bruising, and/or incomplete removal.
Post-Care Instructions: I reviewed with the patient in detail post-care instructions. Patient should stay away from the sun and wear sun protection until treated areas are fully healed.

## 2019-03-29 ENCOUNTER — APPOINTMENT (RX ONLY)
Dept: URBAN - METROPOLITAN AREA CLINIC 71 | Facility: CLINIC | Age: 29
Setting detail: DERMATOLOGY
End: 2019-03-29

## 2019-03-29 ENCOUNTER — APPOINTMENT (RX ONLY)
Dept: URBAN - METROPOLITAN AREA CLINIC 70 | Facility: CLINIC | Age: 29
Setting detail: DERMATOLOGY
End: 2019-03-29

## 2019-03-29 DIAGNOSIS — L40.0 PSORIASIS VULGARIS: ICD-10-CM

## 2019-03-29 PROCEDURE — ? XTRAC LASER

## 2019-03-29 PROCEDURE — 96920 EXCIMER LSR PSRIASIS<250SQCM: CPT

## 2019-03-29 PROCEDURE — 99211 OFF/OP EST MAY X REQ PHY/QHP: CPT | Mod: 25

## 2019-03-29 ASSESSMENT — LOCATION ZONE DERM
LOCATION ZONE: SCALP
LOCATION ZONE: ARM
LOCATION ZONE: SCALP
LOCATION ZONE: ARM

## 2019-03-29 ASSESSMENT — LOCATION SIMPLE DESCRIPTION DERM
LOCATION SIMPLE: SCALP
LOCATION SIMPLE: RIGHT ELBOW
LOCATION SIMPLE: SCALP
LOCATION SIMPLE: RIGHT ELBOW

## 2019-03-29 NOTE — PROCEDURE: XTRAC LASER
Detail Level: Simple
Consent: Written consent obtained, risks reviewed including but not limited to crusting, scabbing, blistering, scarring, darker or lighter pigmentary change, incidental hair removal, bruising, and/or incomplete removal.
Dose Setting #2 (Mj/Cm2): 587
Patient Id: 
Location #2: right elbow
Total Pulses (Will Not Render If 0): 0
Total Energy In Joules: 160
% Increase/Decrease From Last Treatment: 25
Post-Care Instructions: I reviewed with the patient in detail post-care instructions. Patient should stay away from the sun and wear sun protection until treated areas are fully healed.
Treatment Number: 4
Location #1: scalp
Total Square Area In Cm2 (Required For Proper Billing): 93.76
Comments: Tolerated well, Increased today per protocol. MA

## 2019-03-29 NOTE — PROCEDURE: XTRAC LASER
Total Square Area In Cm2 (Required For Proper Billing): 93.76
% Increase/Decrease From Last Treatment: 25
Consent: Written consent obtained, risks reviewed including but not limited to crusting, scabbing, blistering, scarring, darker or lighter pigmentary change, incidental hair removal, bruising, and/or incomplete removal.
Comments: Tolerated well, Increased today per protocol. MA
Dose Setting #1 (Mj/Cm2): 584
Total Pulses (Will Not Render If 0): 0
Patient Id: 
Post-Care Instructions: I reviewed with the patient in detail post-care instructions. Patient should stay away from the sun and wear sun protection until treated areas are fully healed.
Treatment Number: 4
Detail Level: Simple
Total Energy In Joules: 160
Location #2: right elbow
Location #1: scalp

## 2019-04-01 ENCOUNTER — APPOINTMENT (RX ONLY)
Dept: URBAN - METROPOLITAN AREA CLINIC 71 | Facility: CLINIC | Age: 29
Setting detail: DERMATOLOGY
End: 2019-04-01

## 2019-04-01 ENCOUNTER — APPOINTMENT (RX ONLY)
Dept: URBAN - METROPOLITAN AREA CLINIC 70 | Facility: CLINIC | Age: 29
Setting detail: DERMATOLOGY
End: 2019-04-01

## 2019-04-01 DIAGNOSIS — L40.0 PSORIASIS VULGARIS: ICD-10-CM

## 2019-04-01 PROBLEM — L29.8 OTHER PRURITUS: Status: ACTIVE | Noted: 2019-04-01

## 2019-04-01 PROCEDURE — ? XTRAC LASER

## 2019-04-01 PROCEDURE — 96920 EXCIMER LSR PSRIASIS<250SQCM: CPT

## 2019-04-01 ASSESSMENT — LOCATION SIMPLE DESCRIPTION DERM
LOCATION SIMPLE: RIGHT ELBOW
LOCATION SIMPLE: RIGHT ELBOW
LOCATION SIMPLE: SCALP
LOCATION SIMPLE: SCALP

## 2019-04-01 ASSESSMENT — LOCATION ZONE DERM
LOCATION ZONE: ARM
LOCATION ZONE: SCALP
LOCATION ZONE: ARM
LOCATION ZONE: SCALP

## 2019-04-01 ASSESSMENT — LOCATION DETAILED DESCRIPTION DERM
LOCATION DETAILED: RIGHT ELBOW
LOCATION DETAILED: RIGHT SUPERIOR PARIETAL SCALP
LOCATION DETAILED: RIGHT ELBOW
LOCATION DETAILED: RIGHT SUPERIOR PARIETAL SCALP

## 2019-04-01 NOTE — PROCEDURE: XTRAC LASER
% Increase/Decrease From Last Treatment: 25
Location #2: right elbow
Total Square Area In Cm2 (Required For Proper Billing): 188
Post-Care Instructions: I reviewed with the patient in detail post-care instructions. Patient should stay away from the sun and wear sun protection until treated areas are fully healed.
Total Energy In Joules: 137.80
Patient Id: 
Detail Level: Simple
Dose Setting #2 (Mj/Cm2): 730
Total Pulses (Will Not Render If 0): 0
Consent: Written consent obtained, risks reviewed including but not limited to crusting, scabbing, blistering, scarring, darker or lighter pigmentary change, incidental hair removal, bruising, and/or incomplete removal.
Comments: Tolerated well, Increased today per protocol. MA
Treatment Number: 5
Location #1: scalp

## 2019-04-01 NOTE — PROCEDURE: XTRAC LASER
Consent: Written consent obtained, risks reviewed including but not limited to crusting, scabbing, blistering, scarring, darker or lighter pigmentary change, incidental hair removal, bruising, and/or incomplete removal.
Location #2: right elbow
Total Energy In Joules: 137.80
Location #1: scalp
Treatment Number: 5
Patient Id: 
Dose Setting #1 (Mj/Cm2): 733
Comments: Tolerated well, Increased today per protocol. MA
Post-Care Instructions: I reviewed with the patient in detail post-care instructions. Patient should stay away from the sun and wear sun protection until treated areas are fully healed.
Detail Level: Simple
Total Pulses (Will Not Render If 0): 0
% Increase/Decrease From Last Treatment: 25
Total Square Area In Cm2 (Required For Proper Billing): 188

## 2019-04-04 ENCOUNTER — APPOINTMENT (RX ONLY)
Dept: URBAN - METROPOLITAN AREA CLINIC 71 | Facility: CLINIC | Age: 29
Setting detail: DERMATOLOGY
End: 2019-04-04

## 2019-04-04 ENCOUNTER — APPOINTMENT (RX ONLY)
Dept: URBAN - METROPOLITAN AREA CLINIC 70 | Facility: CLINIC | Age: 29
Setting detail: DERMATOLOGY
End: 2019-04-04

## 2019-04-04 DIAGNOSIS — L40.0 PSORIASIS VULGARIS: ICD-10-CM

## 2019-04-04 PROBLEM — D48.5 NEOPLASM OF UNCERTAIN BEHAVIOR OF SKIN: Status: ACTIVE | Noted: 2019-04-04

## 2019-04-04 PROCEDURE — ? XTRAC LASER

## 2019-04-04 PROCEDURE — 96920 EXCIMER LSR PSRIASIS<250SQCM: CPT

## 2019-04-04 PROCEDURE — 99211 OFF/OP EST MAY X REQ PHY/QHP: CPT | Mod: 25

## 2019-04-04 ASSESSMENT — LOCATION ZONE DERM
LOCATION ZONE: ARM
LOCATION ZONE: SCALP
LOCATION ZONE: ARM
LOCATION ZONE: SCALP

## 2019-04-04 ASSESSMENT — LOCATION DETAILED DESCRIPTION DERM
LOCATION DETAILED: RIGHT ELBOW
LOCATION DETAILED: RIGHT ELBOW
LOCATION DETAILED: RIGHT SUPERIOR PARIETAL SCALP
LOCATION DETAILED: RIGHT SUPERIOR PARIETAL SCALP

## 2019-04-04 NOTE — PROCEDURE: XTRAC LASER
Location #1: scalp
% Increase/Decrease From Last Treatment: increased 15% on scalp and repeated dosage on elbow
Total Square Area In Cm2 (Required For Proper Billing): 108
Total Energy In Joules: 90.16
Consent: Written consent obtained, risks reviewed including but not limited to crusting, scabbing, blistering, scarring, darker or lighter pigmentary change, incidental hair removal, bruising, and/or incomplete removal.
Dose Setting #2 (Mj/Cm2): 730
Total Pulses (Will Not Render If 0): 0
Post-Care Instructions: I reviewed with the patient in detail post-care instructions. Patient should stay away from the sun and wear sun protection until treated areas are fully healed.
Treatment Number: 6
Dose Setting #1 (Mj/Cm2): 843
Patient Id: 
Detail Level: Simple
Location #2: right elbow

## 2019-04-04 NOTE — PROCEDURE: XTRAC LASER
Dose Setting #1 (Mj/Cm2): 843
Location #2: right elbow
% Increase/Decrease From Last Treatment: increased 15% on scalp and repeated dosage on elbow
Patient Id: 
Location #1: scalp
Detail Level: Simple
Total Square Area In Cm2 (Required For Proper Billing): 108
Post-Care Instructions: I reviewed with the patient in detail post-care instructions. Patient should stay away from the sun and wear sun protection until treated areas are fully healed.
Consent: Written consent obtained, risks reviewed including but not limited to crusting, scabbing, blistering, scarring, darker or lighter pigmentary change, incidental hair removal, bruising, and/or incomplete removal.
Total Energy In Joules: 90.16
Treatment Number: 6
Dose Setting #2 (Mj/Cm2): 735
Total Pulses (Will Not Render If 0): 0

## 2019-04-08 ENCOUNTER — APPOINTMENT (RX ONLY)
Dept: URBAN - METROPOLITAN AREA CLINIC 71 | Facility: CLINIC | Age: 29
Setting detail: DERMATOLOGY
End: 2019-04-08

## 2019-04-08 ENCOUNTER — APPOINTMENT (RX ONLY)
Dept: URBAN - METROPOLITAN AREA CLINIC 70 | Facility: CLINIC | Age: 29
Setting detail: DERMATOLOGY
End: 2019-04-08

## 2019-04-08 DIAGNOSIS — L40.0 PSORIASIS VULGARIS: ICD-10-CM

## 2019-04-08 PROCEDURE — 96920 EXCIMER LSR PSRIASIS<250SQCM: CPT

## 2019-04-08 PROCEDURE — ? XTRAC LASER

## 2019-04-08 ASSESSMENT — LOCATION ZONE DERM
LOCATION ZONE: SCALP
LOCATION ZONE: ARM
LOCATION ZONE: ARM
LOCATION ZONE: SCALP

## 2019-04-08 ASSESSMENT — LOCATION DETAILED DESCRIPTION DERM
LOCATION DETAILED: RIGHT SUPERIOR PARIETAL SCALP
LOCATION DETAILED: RIGHT ELBOW
LOCATION DETAILED: RIGHT SUPERIOR PARIETAL SCALP
LOCATION DETAILED: RIGHT ELBOW

## 2019-04-08 NOTE — PROCEDURE: XTRAC LASER
% Increase/Decrease From Last Treatment: increased 15% on scalp and repeated dosage on elbow
Treatment Number: 7
Consent: Written consent obtained, risks reviewed including but not limited to crusting, scabbing, blistering, scarring, darker or lighter pigmentary change, incidental hair removal, bruising, and/or incomplete removal.
Total Square Area In Cm2 (Required For Proper Billing): 184
Location #1: scalp
Total Energy In Joules: 177.35
Dose Setting #2 (Mj/Cm2): 73
Dose Setting #1 (Mj/Cm2): 966
Patient Id: 
Post-Care Instructions: I reviewed with the patient in detail post-care instructions. Patient should stay away from the sun and wear sun protection until treated areas are fully healed.
Detail Level: Simple
Location #2: right elbow
Total Pulses (Will Not Render If 0): 0

## 2019-04-08 NOTE — PROCEDURE: XTRAC LASER
Dose Setting #1 (Mj/Cm2): 960
Location #2: right elbow
Consent: Written consent obtained, risks reviewed including but not limited to crusting, scabbing, blistering, scarring, darker or lighter pigmentary change, incidental hair removal, bruising, and/or incomplete removal.
Total Pulses (Will Not Render If 0): 0
Location #1: scalp
Total Energy In Joules: 177.35
Detail Level: Simple
Treatment Number: 7
Total Square Area In Cm2 (Required For Proper Billing): 184
Post-Care Instructions: I reviewed with the patient in detail post-care instructions. Patient should stay away from the sun and wear sun protection until treated areas are fully healed.
Dose Setting #2 (Mj/Cm2): 739
Patient Id: 
% Increase/Decrease From Last Treatment: increased 15% on scalp and repeated dosage on elbow

## 2019-04-10 ENCOUNTER — APPOINTMENT (RX ONLY)
Dept: URBAN - METROPOLITAN AREA CLINIC 70 | Facility: CLINIC | Age: 29
Setting detail: DERMATOLOGY
End: 2019-04-10

## 2019-04-10 ENCOUNTER — APPOINTMENT (RX ONLY)
Dept: URBAN - METROPOLITAN AREA CLINIC 71 | Facility: CLINIC | Age: 29
Setting detail: DERMATOLOGY
End: 2019-04-10

## 2019-04-10 DIAGNOSIS — L40.0 PSORIASIS VULGARIS: ICD-10-CM

## 2019-04-10 PROCEDURE — ? XTRAC LASER

## 2019-04-10 PROCEDURE — 96920 EXCIMER LSR PSRIASIS<250SQCM: CPT

## 2019-04-10 ASSESSMENT — LOCATION DETAILED DESCRIPTION DERM
LOCATION DETAILED: RIGHT SUPERIOR PARIETAL SCALP
LOCATION DETAILED: RIGHT ELBOW
LOCATION DETAILED: RIGHT ELBOW
LOCATION DETAILED: RIGHT SUPERIOR PARIETAL SCALP

## 2019-04-10 ASSESSMENT — LOCATION ZONE DERM
LOCATION ZONE: SCALP
LOCATION ZONE: SCALP
LOCATION ZONE: ARM
LOCATION ZONE: ARM

## 2019-04-10 ASSESSMENT — LOCATION SIMPLE DESCRIPTION DERM
LOCATION SIMPLE: RIGHT ELBOW
LOCATION SIMPLE: SCALP
LOCATION SIMPLE: SCALP
LOCATION SIMPLE: RIGHT ELBOW

## 2019-04-10 NOTE — PROCEDURE: XTRAC LASER
Total Square Area In Cm2 (Required For Proper Billing): 156
Dose Setting #1 (Mj/Cm2): 960
Patient Id: 
Location #1: scalp
Consent: Written consent obtained, risks reviewed including but not limited to crusting, scabbing, blistering, scarring, darker or lighter pigmentary change, incidental hair removal, bruising, and/or incomplete removal.
Treatment Number: 8
Post-Care Instructions: I reviewed with the patient in detail post-care instructions. Patient should stay away from the sun and wear sun protection until treated areas are fully healed.
Dose Setting #2 (Mj/Cm2): 731
Detail Level: Simple
Comments: Maintained dosage per pt request. NS
Total Pulses (Will Not Render If 0): 0
Location #2: right elbow
Total Energy In Joules: 149.28

## 2019-04-10 NOTE — PROCEDURE: XTRAC LASER
Detail Level: Simple
Location #1: scalp
Total Pulses (Will Not Render If 0): 0
Post-Care Instructions: I reviewed with the patient in detail post-care instructions. Patient should stay away from the sun and wear sun protection until treated areas are fully healed.
Consent: Written consent obtained, risks reviewed including but not limited to crusting, scabbing, blistering, scarring, darker or lighter pigmentary change, incidental hair removal, bruising, and/or incomplete removal.
Location #2: right elbow
Comments: Maintained dosage per pt request. NS
Dose Setting #2 (Mj/Cm2): 731
Total Square Area In Cm2 (Required For Proper Billing): 156
Treatment Number: 8
Total Energy In Joules: 149.28
Dose Setting #1 (Mj/Cm2): 965
Patient Id:

## 2019-04-15 ENCOUNTER — APPOINTMENT (RX ONLY)
Dept: URBAN - METROPOLITAN AREA CLINIC 70 | Facility: CLINIC | Age: 29
Setting detail: DERMATOLOGY
End: 2019-04-15

## 2019-04-15 ENCOUNTER — APPOINTMENT (RX ONLY)
Dept: URBAN - METROPOLITAN AREA CLINIC 71 | Facility: CLINIC | Age: 29
Setting detail: DERMATOLOGY
End: 2019-04-15

## 2019-04-15 DIAGNOSIS — L40.0 PSORIASIS VULGARIS: ICD-10-CM

## 2019-04-15 PROBLEM — L29.8 OTHER PRURITUS: Status: ACTIVE | Noted: 2019-04-15

## 2019-04-15 PROCEDURE — ? XTRAC LASER

## 2019-04-15 PROCEDURE — 96920 EXCIMER LSR PSRIASIS<250SQCM: CPT

## 2019-04-15 ASSESSMENT — LOCATION DETAILED DESCRIPTION DERM
LOCATION DETAILED: RIGHT ELBOW
LOCATION DETAILED: RIGHT SUPERIOR PARIETAL SCALP
LOCATION DETAILED: RIGHT SUPERIOR PARIETAL SCALP
LOCATION DETAILED: RIGHT ELBOW

## 2019-04-15 ASSESSMENT — LOCATION ZONE DERM
LOCATION ZONE: ARM
LOCATION ZONE: ARM
LOCATION ZONE: SCALP
LOCATION ZONE: SCALP

## 2019-04-15 NOTE — PROCEDURE: XTRAC LASER
Post-Care Instructions: I reviewed with the patient in detail post-care instructions. Patient should stay away from the sun and wear sun protection until treated areas are fully healed.
Total Energy In Joules: 144.87
Detail Level: Simple
Comments: Increased dose to both lesion by 15 % per her request. MA
Treatment Number: 9
Dose Setting #1 (Mj/Cm2): 1273
Location #2: right elbow
Dose Setting #2 (Mj/Cm2): 849
Total Pulses (Will Not Render If 0): 0
Location #1: scalp
Consent: Written consent obtained, risks reviewed including but not limited to crusting, scabbing, blistering, scarring, darker or lighter pigmentary change, incidental hair removal, bruising, and/or incomplete removal.
Patient Id: 
Total Square Area In Cm2 (Required For Proper Billing): 132

## 2019-04-15 NOTE — PROCEDURE: XTRAC LASER
Total Square Area In Cm2 (Required For Proper Billing): 132
Total Energy In Joules: 144.87
Location #2: right elbow
Consent: Written consent obtained, risks reviewed including but not limited to crusting, scabbing, blistering, scarring, darker or lighter pigmentary change, incidental hair removal, bruising, and/or incomplete removal.
Comments: Increased dose to both lesion by 15 % per her request. MA
Treatment Number: 9
Detail Level: Simple
Dose Setting #1 (Mj/Cm2): 2861
Dose Setting #2 (Mj/Cm2): 849
Location #1: scalp
Patient Id: 
Post-Care Instructions: I reviewed with the patient in detail post-care instructions. Patient should stay away from the sun and wear sun protection until treated areas are fully healed.
Total Pulses (Will Not Render If 0): 0

## 2019-04-19 ENCOUNTER — APPOINTMENT (RX ONLY)
Dept: URBAN - METROPOLITAN AREA CLINIC 70 | Facility: CLINIC | Age: 29
Setting detail: DERMATOLOGY
End: 2019-04-19

## 2019-04-19 ENCOUNTER — APPOINTMENT (RX ONLY)
Dept: URBAN - METROPOLITAN AREA CLINIC 71 | Facility: CLINIC | Age: 29
Setting detail: DERMATOLOGY
End: 2019-04-19

## 2019-04-19 DIAGNOSIS — L40.0 PSORIASIS VULGARIS: ICD-10-CM

## 2019-04-19 PROBLEM — L29.8 OTHER PRURITUS: Status: ACTIVE | Noted: 2019-04-19

## 2019-04-19 PROCEDURE — 99211 OFF/OP EST MAY X REQ PHY/QHP: CPT | Mod: 25

## 2019-04-19 PROCEDURE — ? XTRAC LASER

## 2019-04-19 PROCEDURE — 96920 EXCIMER LSR PSRIASIS<250SQCM: CPT

## 2019-04-19 ASSESSMENT — LOCATION SIMPLE DESCRIPTION DERM
LOCATION SIMPLE: SCALP
LOCATION SIMPLE: SCALP
LOCATION SIMPLE: RIGHT ELBOW
LOCATION SIMPLE: RIGHT ELBOW

## 2019-04-19 ASSESSMENT — LOCATION ZONE DERM
LOCATION ZONE: ARM
LOCATION ZONE: SCALP
LOCATION ZONE: ARM
LOCATION ZONE: SCALP

## 2019-04-19 NOTE — PROCEDURE: XTRAC LASER
Dose Setting #1 (Mj/Cm2): 8678
Total Square Area In Cm2 (Required For Proper Billing): 120
Location #1: scalp
Consent: Written consent obtained, risks reviewed including but not limited to crusting, scabbing, blistering, scarring, darker or lighter pigmentary change, incidental hair removal, bruising, and/or incomplete removal.
Detail Level: Simple
Total Pulses (Will Not Render If 0): 0
Comments: Increased dose on scalp by 10% and repeated dosage on elbow per her request.
Patient Id: 
Location #2: right elbow
Treatment Number: 10
Post-Care Instructions: I reviewed with the patient in detail post-care instructions. Patient should stay away from the sun and wear sun protection until treated areas are fully healed.
Total Energy In Joules: 131.50
Dose Setting #2 (Mj/Cm2): 848

## 2019-04-19 NOTE — PROCEDURE: XTRAC LASER
Dose Setting #2 (Mj/Cm2): 841
Dose Setting #1 (Mj/Cm2): 2805
Detail Level: Simple
Total Square Area In Cm2 (Required For Proper Billing): 120
Location #1: scalp
Location #2: right elbow
Total Energy In Joules: 131.50
Consent: Written consent obtained, risks reviewed including but not limited to crusting, scabbing, blistering, scarring, darker or lighter pigmentary change, incidental hair removal, bruising, and/or incomplete removal.
Patient Id: 
Post-Care Instructions: I reviewed with the patient in detail post-care instructions. Patient should stay away from the sun and wear sun protection until treated areas are fully healed.
Total Pulses (Will Not Render If 0): 0
Comments: Increased dose on scalp by 10% and repeated dosage on elbow per her request.
Treatment Number: 10

## 2019-04-23 ENCOUNTER — APPOINTMENT (RX ONLY)
Dept: URBAN - METROPOLITAN AREA CLINIC 71 | Facility: CLINIC | Age: 29
Setting detail: DERMATOLOGY
End: 2019-04-23

## 2019-04-23 ENCOUNTER — APPOINTMENT (RX ONLY)
Dept: URBAN - METROPOLITAN AREA CLINIC 70 | Facility: CLINIC | Age: 29
Setting detail: DERMATOLOGY
End: 2019-04-23

## 2019-04-23 DIAGNOSIS — L40.0 PSORIASIS VULGARIS: ICD-10-CM

## 2019-04-23 PROCEDURE — 99211 OFF/OP EST MAY X REQ PHY/QHP: CPT | Mod: 25

## 2019-04-23 PROCEDURE — 96920 EXCIMER LSR PSRIASIS<250SQCM: CPT

## 2019-04-23 PROCEDURE — ? XTRAC LASER

## 2019-04-23 ASSESSMENT — LOCATION ZONE DERM
LOCATION ZONE: ARM
LOCATION ZONE: SCALP
LOCATION ZONE: ARM
LOCATION ZONE: SCALP

## 2019-04-23 NOTE — PROCEDURE: XTRAC LASER
Treatment Number: 11
Location #1: scalp
Total Square Area In Cm2 (Required For Proper Billing): 136
Consent: Written consent obtained, risks reviewed including but not limited to crusting, scabbing, blistering, scarring, darker or lighter pigmentary change, incidental hair removal, bruising, and/or incomplete removal.
Post-Care Instructions: I reviewed with the patient in detail post-care instructions. Patient should stay away from the sun and wear sun protection until treated areas are fully healed.
Dose Setting #2 (Mj/Cm2): 929
Location #2: right elbow
Total Pulses (Will Not Render If 0): 0
Detail Level: Simple
Patient Id: 
Comments: Increased dose on scalp by 15% and increased dosage on elbow by 10% .TE
Dose Setting #1 (Mj/Cm2): 4623
Total Energy In Joules: 187.76

## 2019-04-23 NOTE — PROCEDURE: XTRAC LASER
Post-Care Instructions: I reviewed with the patient in detail post-care instructions. Patient should stay away from the sun and wear sun protection until treated areas are fully healed.
Total Square Area In Cm2 (Required For Proper Billing): 136
Dose Setting #2 (Mj/Cm2): 927
Dose Setting #1 (Mj/Cm2): 9649
Consent: Written consent obtained, risks reviewed including but not limited to crusting, scabbing, blistering, scarring, darker or lighter pigmentary change, incidental hair removal, bruising, and/or incomplete removal.
Location #2: right elbow
Total Pulses (Will Not Render If 0): 0
Treatment Number: 11
Patient Id: 
Total Energy In Joules: 187.76
Detail Level: Simple
Comments: Increased dose on scalp by 15% and increased dosage on elbow by 10% .TE
Location #1: scalp

## 2019-04-26 ENCOUNTER — APPOINTMENT (RX ONLY)
Dept: URBAN - METROPOLITAN AREA CLINIC 70 | Facility: CLINIC | Age: 29
Setting detail: DERMATOLOGY
End: 2019-04-26

## 2019-04-26 ENCOUNTER — APPOINTMENT (RX ONLY)
Dept: URBAN - METROPOLITAN AREA CLINIC 71 | Facility: CLINIC | Age: 29
Setting detail: DERMATOLOGY
End: 2019-04-26

## 2019-04-26 DIAGNOSIS — L40.0 PSORIASIS VULGARIS: ICD-10-CM

## 2019-04-26 PROCEDURE — ? XTRAC LASER

## 2019-04-26 PROCEDURE — 99211 OFF/OP EST MAY X REQ PHY/QHP: CPT | Mod: 25

## 2019-04-26 PROCEDURE — 96920 EXCIMER LSR PSRIASIS<250SQCM: CPT

## 2019-04-26 ASSESSMENT — LOCATION ZONE DERM
LOCATION ZONE: SCALP
LOCATION ZONE: ARM
LOCATION ZONE: SCALP
LOCATION ZONE: ARM

## 2019-04-26 NOTE — PROCEDURE: XTRAC LASER
Consent: Written consent obtained, risks reviewed including but not limited to crusting, scabbing, blistering, scarring, darker or lighter pigmentary change, incidental hair removal, bruising, and/or incomplete removal.
Treatment Number: 12
Total Square Area In Cm2 (Required For Proper Billing): 148
Location #1: scalp
Detail Level: Simple
Comments: Increased dose on scalp by 10% and repeated dosage on elbow per her request.
Dose Setting #1 (Mj/Cm2): 0731
Total Energy In Joules: 224.41
Post-Care Instructions: I reviewed with the patient in detail post-care instructions. Patient should stay away from the sun and wear sun protection until treated areas are fully healed.
Total Pulses (Will Not Render If 0): 0
Dose Setting #2 (Mj/Cm2): 929
Location #2: right elbow
Patient Id:

## 2019-04-26 NOTE — PROCEDURE: XTRAC LASER
Total Energy In Joules: 224.41
Post-Care Instructions: I reviewed with the patient in detail post-care instructions. Patient should stay away from the sun and wear sun protection until treated areas are fully healed.
Total Square Area In Cm2 (Required For Proper Billing): 148
Total Pulses (Will Not Render If 0): 0
Consent: Written consent obtained, risks reviewed including but not limited to crusting, scabbing, blistering, scarring, darker or lighter pigmentary change, incidental hair removal, bruising, and/or incomplete removal.
Dose Setting #2 (Mj/Cm2): 925
Detail Level: Simple
Comments: Increased dose on scalp by 10% and repeated dosage on elbow per her request.
Patient Id: 
Location #2: right elbow
Treatment Number: 12
Dose Setting #1 (Mj/Cm2): 0824
Location #1: scalp

## 2019-05-06 ENCOUNTER — APPOINTMENT (RX ONLY)
Dept: URBAN - METROPOLITAN AREA CLINIC 71 | Facility: CLINIC | Age: 29
Setting detail: DERMATOLOGY
End: 2019-05-06

## 2019-05-06 ENCOUNTER — APPOINTMENT (RX ONLY)
Dept: URBAN - METROPOLITAN AREA CLINIC 70 | Facility: CLINIC | Age: 29
Setting detail: DERMATOLOGY
End: 2019-05-06

## 2019-05-06 DIAGNOSIS — L40.0 PSORIASIS VULGARIS: ICD-10-CM

## 2019-05-06 PROCEDURE — ? XTRAC LASER

## 2019-05-06 PROCEDURE — 96920 EXCIMER LSR PSRIASIS<250SQCM: CPT

## 2019-05-06 PROCEDURE — 99211 OFF/OP EST MAY X REQ PHY/QHP: CPT | Mod: 25

## 2019-05-06 ASSESSMENT — LOCATION ZONE DERM
LOCATION ZONE: SCALP
LOCATION ZONE: SCALP
LOCATION ZONE: ARM
LOCATION ZONE: ARM

## 2019-05-06 NOTE — PROCEDURE: XTRAC LASER
% Increase/Decrease From Last Treatment: Repeated Dose due to pt.being out of town for one week. GB
Comments: Increased dose on scalp by 10% and repeated dosage on elbow per her request.
Total Pulses (Will Not Render If 0): 0
Total Square Area In Cm2 (Required For Proper Billing): 76
Patient Id: 
Location #1: scalp
Treatment Number: 13
Dose Setting #1 (Mj/Cm2): 4457
Dose Setting #2 (Mj/Cm2): 923
Consent: Written consent obtained, risks reviewed including but not limited to crusting, scabbing, blistering, scarring, darker or lighter pigmentary change, incidental hair removal, bruising, and/or incomplete removal.
Post-Care Instructions: I reviewed with the patient in detail post-care instructions. Patient should stay away from the sun and wear sun protection until treated areas are fully healed.
Location #2: right elbow
Detail Level: Simple

## 2019-05-06 NOTE — PROCEDURE: XTRAC LASER
Dose Setting #2 (Mj/Cm2): 92
Location #2: right elbow
Detail Level: Simple
Location #1: scalp
Total Pulses (Will Not Render If 0): 0
Treatment Number: 13
Patient Id: 
% Increase/Decrease From Last Treatment: Repeated Dose due to pt.being out of town for one week. GB
Consent: Written consent obtained, risks reviewed including but not limited to crusting, scabbing, blistering, scarring, darker or lighter pigmentary change, incidental hair removal, bruising, and/or incomplete removal.
Total Square Area In Cm2 (Required For Proper Billing): 76
Comments: Increased dose on scalp by 10% and repeated dosage on elbow per her request.
Dose Setting #1 (Mj/Cm2): 5301
Post-Care Instructions: I reviewed with the patient in detail post-care instructions. Patient should stay away from the sun and wear sun protection until treated areas are fully healed.

## 2019-05-08 ENCOUNTER — APPOINTMENT (RX ONLY)
Dept: URBAN - METROPOLITAN AREA CLINIC 70 | Facility: CLINIC | Age: 29
Setting detail: DERMATOLOGY
End: 2019-05-08

## 2019-05-08 ENCOUNTER — APPOINTMENT (RX ONLY)
Dept: URBAN - METROPOLITAN AREA CLINIC 71 | Facility: CLINIC | Age: 29
Setting detail: DERMATOLOGY
End: 2019-05-08

## 2019-05-08 DIAGNOSIS — L40.0 PSORIASIS VULGARIS: ICD-10-CM

## 2019-05-08 PROCEDURE — ? XTRAC LASER

## 2019-05-08 PROCEDURE — 96920 EXCIMER LSR PSRIASIS<250SQCM: CPT

## 2019-05-08 ASSESSMENT — LOCATION ZONE DERM
LOCATION ZONE: ARM
LOCATION ZONE: SCALP
LOCATION ZONE: ARM
LOCATION ZONE: SCALP

## 2019-05-08 NOTE — PROCEDURE: XTRAC LASER
Detail Level: Simple
Treatment Number: 14
Total Pulses (Will Not Render If 0): 0
Total Energy In Joules: 222.53
Location #2: right elbow
Comments: Increased dose on scalp by 10% and repeated dosage on elbow per her request.
Location #1: scalp
Patient Id: 
Total Square Area In Cm2 (Required For Proper Billing): 136
Dose Setting #1 (Mj/Cm2): 4493
% Increase/Decrease From Last Treatment: 10% increase on scalp
Consent: Written consent obtained, risks reviewed including but not limited to crusting, scabbing, blistering, scarring, darker or lighter pigmentary change, incidental hair removal, bruising, and/or incomplete removal.
Post-Care Instructions: I reviewed with the patient in detail post-care instructions. Patient should stay away from the sun and wear sun protection until treated areas are fully healed.
Dose Setting #2 (Mj/Cm2): 925

## 2019-05-08 NOTE — PROCEDURE: XTRAC LASER
Comments: Increased dose on scalp by 10% and repeated dosage on elbow per her request.
Dose Setting #1 (Mj/Cm2): 8103
Post-Care Instructions: I reviewed with the patient in detail post-care instructions. Patient should stay away from the sun and wear sun protection until treated areas are fully healed.
% Increase/Decrease From Last Treatment: 10% increase on scalp
Location #2: right elbow
Total Square Area In Cm2 (Required For Proper Billing): 136
Patient Id: 
Dose Setting #2 (Mj/Cm2): 928
Treatment Number: 14
Total Pulses (Will Not Render If 0): 0
Total Energy In Joules: 222.53
Location #1: scalp
Consent: Written consent obtained, risks reviewed including but not limited to crusting, scabbing, blistering, scarring, darker or lighter pigmentary change, incidental hair removal, bruising, and/or incomplete removal.
Detail Level: Simple

## 2019-05-13 ENCOUNTER — APPOINTMENT (RX ONLY)
Dept: URBAN - METROPOLITAN AREA CLINIC 71 | Facility: CLINIC | Age: 29
Setting detail: DERMATOLOGY
End: 2019-05-13

## 2019-05-13 ENCOUNTER — APPOINTMENT (RX ONLY)
Dept: URBAN - METROPOLITAN AREA CLINIC 70 | Facility: CLINIC | Age: 29
Setting detail: DERMATOLOGY
End: 2019-05-13

## 2019-05-13 DIAGNOSIS — L40.0 PSORIASIS VULGARIS: ICD-10-CM

## 2019-05-13 PROCEDURE — 96920 EXCIMER LSR PSRIASIS<250SQCM: CPT

## 2019-05-13 PROCEDURE — ? XTRAC LASER

## 2019-05-13 PROCEDURE — 99211 OFF/OP EST MAY X REQ PHY/QHP: CPT | Mod: 25

## 2019-05-13 ASSESSMENT — LOCATION ZONE DERM
LOCATION ZONE: SCALP
LOCATION ZONE: ARM
LOCATION ZONE: SCALP
LOCATION ZONE: ARM

## 2019-05-13 NOTE — PROCEDURE: XTRAC LASER
% Increase/Decrease From Last Treatment: 10% increase on scalp
Dose Setting #1 (Mj/Cm2): 5414
Post-Care Instructions: I reviewed with the patient in detail post-care instructions. Patient should stay away from the sun and wear sun protection until treated areas are fully healed.
Detail Level: Simple
Patient Id: 
Consent: Written consent obtained, risks reviewed including but not limited to crusting, scabbing, blistering, scarring, darker or lighter pigmentary change, incidental hair removal, bruising, and/or incomplete removal.
Location #1: scalp
Total Square Area In Cm2 (Required For Proper Billing): 100
Dose Setting #2 (Mj/Cm2): 923
Location #2: right elbow
Comments: Increased dose on scalp by 10% and repeated dosage on elbow per her request.
Treatment Number: 15
Total Pulses (Will Not Render If 0): 0

## 2019-05-13 NOTE — PROCEDURE: XTRAC LASER
Total Square Area In Cm2 (Required For Proper Billing): 100
Detail Level: Simple
% Increase/Decrease From Last Treatment: 10% increase on scalp
Location #1: scalp
Treatment Number: 15
Comments: Increased dose on scalp by 10% and repeated dosage on elbow per her request.
Patient Id: 
Location #2: right elbow
Consent: Written consent obtained, risks reviewed including but not limited to crusting, scabbing, blistering, scarring, darker or lighter pigmentary change, incidental hair removal, bruising, and/or incomplete removal.
Total Pulses (Will Not Render If 0): 0
Dose Setting #1 (Mj/Cm2): 8307
Post-Care Instructions: I reviewed with the patient in detail post-care instructions. Patient should stay away from the sun and wear sun protection until treated areas are fully healed.
Dose Setting #2 (Mj/Cm2): 929

## 2019-05-17 ENCOUNTER — APPOINTMENT (RX ONLY)
Dept: URBAN - METROPOLITAN AREA CLINIC 71 | Facility: CLINIC | Age: 29
Setting detail: DERMATOLOGY
End: 2019-05-17

## 2019-05-17 ENCOUNTER — APPOINTMENT (RX ONLY)
Dept: URBAN - METROPOLITAN AREA CLINIC 70 | Facility: CLINIC | Age: 29
Setting detail: DERMATOLOGY
End: 2019-05-17

## 2019-05-17 DIAGNOSIS — L40.0 PSORIASIS VULGARIS: ICD-10-CM

## 2019-05-17 PROBLEM — L29.8 OTHER PRURITUS: Status: ACTIVE | Noted: 2019-05-17

## 2019-05-17 PROCEDURE — ? XTRAC LASER

## 2019-05-17 PROCEDURE — 99211 OFF/OP EST MAY X REQ PHY/QHP: CPT | Mod: 25

## 2019-05-17 PROCEDURE — 96920 EXCIMER LSR PSRIASIS<250SQCM: CPT

## 2019-05-17 ASSESSMENT — LOCATION ZONE DERM
LOCATION ZONE: ARM
LOCATION ZONE: SCALP
LOCATION ZONE: SCALP
LOCATION ZONE: ARM

## 2019-05-17 NOTE — PROCEDURE: XTRAC LASER
Total Square Area In Cm2 (Required For Proper Billing): 100
Consent: Written consent obtained, risks reviewed including but not limited to crusting, scabbing, blistering, scarring, darker or lighter pigmentary change, incidental hair removal, bruising, and/or incomplete removal.
Location #1: scalp
Total Pulses (Will Not Render If 0): 0
Comments: Increased dose on scalp by 10% and repeated dosage on elbow per her request.
Patient Id: 
Dose Setting #2 (Mj/Cm2): 929
Treatment Number: 16
Detail Level: Simple
Dose Setting #1 (Mj/Cm2): 5389
Location #2: right elbow
% Increase/Decrease From Last Treatment: 10% increase on scalp
Post-Care Instructions: I reviewed with the patient in detail post-care instructions. Patient should stay away from the sun and wear sun protection until treated areas are fully healed.

## 2019-05-17 NOTE — PROCEDURE: XTRAC LASER
Total Pulses (Will Not Render If 0): 0
Detail Level: Simple
Dose Setting #1 (Mj/Cm2): 5033
Dose Setting #2 (Mj/Cm2): 921
Consent: Written consent obtained, risks reviewed including but not limited to crusting, scabbing, blistering, scarring, darker or lighter pigmentary change, incidental hair removal, bruising, and/or incomplete removal.
% Increase/Decrease From Last Treatment: 10% increase on scalp
Comments: Increased dose on scalp by 10% and repeated dosage on elbow per her request.
Location #2: right elbow
Location #1: scalp
Patient Id: 
Total Square Area In Cm2 (Required For Proper Billing): 100
Post-Care Instructions: I reviewed with the patient in detail post-care instructions. Patient should stay away from the sun and wear sun protection until treated areas are fully healed.
Treatment Number: 16

## 2019-05-20 ENCOUNTER — APPOINTMENT (RX ONLY)
Dept: URBAN - METROPOLITAN AREA CLINIC 70 | Facility: CLINIC | Age: 29
Setting detail: DERMATOLOGY
End: 2019-05-20

## 2019-05-20 ENCOUNTER — APPOINTMENT (RX ONLY)
Dept: URBAN - METROPOLITAN AREA CLINIC 71 | Facility: CLINIC | Age: 29
Setting detail: DERMATOLOGY
End: 2019-05-20

## 2019-05-20 DIAGNOSIS — L40.0 PSORIASIS VULGARIS: ICD-10-CM

## 2019-05-20 PROBLEM — D48.5 NEOPLASM OF UNCERTAIN BEHAVIOR OF SKIN: Status: ACTIVE | Noted: 2019-05-20

## 2019-05-20 PROCEDURE — ? XTRAC LASER

## 2019-05-20 PROCEDURE — 99211 OFF/OP EST MAY X REQ PHY/QHP: CPT | Mod: 25

## 2019-05-20 PROCEDURE — 96920 EXCIMER LSR PSRIASIS<250SQCM: CPT

## 2019-05-20 ASSESSMENT — LOCATION ZONE DERM
LOCATION ZONE: ARM
LOCATION ZONE: SCALP
LOCATION ZONE: SCALP
LOCATION ZONE: ARM

## 2019-05-20 NOTE — PROCEDURE: XTRAC LASER
Consent: Written consent obtained, risks reviewed including but not limited to crusting, scabbing, blistering, scarring, darker or lighter pigmentary change, incidental hair removal, bruising, and/or incomplete removal.
Dose Setting #1 (Mj/Cm2): 1123
Treatment Number: 17
Post-Care Instructions: I reviewed with the patient in detail post-care instructions. Patient should stay away from the sun and wear sun protection until treated areas are fully healed.
Comments: Increased dose on scalp by 10% and repeated dosage on elbow per her request.
Total Square Area In Cm2 (Required For Proper Billing): 89
Dose Setting #2 (Mj/Cm2): 923
Total Pulses (Will Not Render If 0): 0
Location #1: scalp
% Increase/Decrease From Last Treatment: 10% increase on scalp
Location #2: right elbow
Detail Level: Simple
Patient Id:

## 2019-05-20 NOTE — PROCEDURE: XTRAC LASER
Patient Id: 
Treatment Number: 17
Total Pulses (Will Not Render If 0): 0
Location #2: right elbow
Post-Care Instructions: I reviewed with the patient in detail post-care instructions. Patient should stay away from the sun and wear sun protection until treated areas are fully healed.
Detail Level: Simple
Location #1: scalp
% Increase/Decrease From Last Treatment: 10% increase on scalp
Total Square Area In Cm2 (Required For Proper Billing): 89
Dose Setting #1 (Mj/Cm2): 1125
Consent: Written consent obtained, risks reviewed including but not limited to crusting, scabbing, blistering, scarring, darker or lighter pigmentary change, incidental hair removal, bruising, and/or incomplete removal.
Dose Setting #2 (Mj/Cm2): 92
Comments: Increased dose on scalp by 10% and repeated dosage on elbow per her request.

## 2019-05-24 ENCOUNTER — APPOINTMENT (RX ONLY)
Dept: URBAN - METROPOLITAN AREA CLINIC 70 | Facility: CLINIC | Age: 29
Setting detail: DERMATOLOGY
End: 2019-05-24

## 2019-05-24 ENCOUNTER — APPOINTMENT (RX ONLY)
Dept: URBAN - METROPOLITAN AREA CLINIC 71 | Facility: CLINIC | Age: 29
Setting detail: DERMATOLOGY
End: 2019-05-24

## 2019-05-24 DIAGNOSIS — L40.0 PSORIASIS VULGARIS: ICD-10-CM

## 2019-05-24 PROBLEM — L29.8 OTHER PRURITUS: Status: ACTIVE | Noted: 2019-05-24

## 2019-05-24 PROCEDURE — ? XTRAC LASER

## 2019-05-24 PROCEDURE — 96920 EXCIMER LSR PSRIASIS<250SQCM: CPT

## 2019-05-24 ASSESSMENT — LOCATION ZONE DERM
LOCATION ZONE: SCALP
LOCATION ZONE: ARM
LOCATION ZONE: SCALP
LOCATION ZONE: ARM

## 2019-05-24 ASSESSMENT — LOCATION SIMPLE DESCRIPTION DERM
LOCATION SIMPLE: RIGHT ELBOW
LOCATION SIMPLE: SCALP
LOCATION SIMPLE: RIGHT ELBOW
LOCATION SIMPLE: SCALP

## 2019-05-24 NOTE — PROCEDURE: XTRAC LASER
Detail Level: Simple
Location #1: scalp
Treatment Number: 18
Total Square Area In Cm2 (Required For Proper Billing): 104
Patient Id: 
Location #2: right elbow
Dose Setting #1 (Mj/Cm2): 5806
Post-Care Instructions: I reviewed with the patient in detail post-care instructions. Patient should stay away from the sun and wear sun protection until treated areas are fully healed.
Consent: Written consent obtained, risks reviewed including but not limited to crusting, scabbing, blistering, scarring, darker or lighter pigmentary change, incidental hair removal, bruising, and/or incomplete removal.
Total Pulses (Will Not Render If 0): 0
Dose Setting #2 (Mj/Cm2): 925
% Increase/Decrease From Last Treatment: 10% increase on scalp, RPT on elbow
Comments: Increased dose on scalp by 10% and repeated dosage on elbow per her request.

## 2019-05-24 NOTE — PROCEDURE: XTRAC LASER
Post-Care Instructions: I reviewed with the patient in detail post-care instructions. Patient should stay away from the sun and wear sun protection until treated areas are fully healed.
Detail Level: Simple
Dose Setting #2 (Mj/Cm2): 922
Location #1: scalp
Total Square Area In Cm2 (Required For Proper Billing): 104
Treatment Number: 18
Location #2: right elbow
Consent: Written consent obtained, risks reviewed including but not limited to crusting, scabbing, blistering, scarring, darker or lighter pigmentary change, incidental hair removal, bruising, and/or incomplete removal.
Comments: Increased dose on scalp by 10% and repeated dosage on elbow per her request.
Patient Id: 
Dose Setting #1 (Mj/Cm2): 7705
Total Pulses (Will Not Render If 0): 0
% Increase/Decrease From Last Treatment: 10% increase on scalp, RPT on elbow

## 2019-05-28 ENCOUNTER — APPOINTMENT (RX ONLY)
Dept: URBAN - METROPOLITAN AREA CLINIC 71 | Facility: CLINIC | Age: 29
Setting detail: DERMATOLOGY
End: 2019-05-28

## 2019-05-28 ENCOUNTER — APPOINTMENT (RX ONLY)
Dept: URBAN - METROPOLITAN AREA CLINIC 70 | Facility: CLINIC | Age: 29
Setting detail: DERMATOLOGY
End: 2019-05-28

## 2019-05-28 DIAGNOSIS — L40.0 PSORIASIS VULGARIS: ICD-10-CM

## 2019-05-28 PROCEDURE — ? XTRAC LASER

## 2019-05-28 PROCEDURE — 99211 OFF/OP EST MAY X REQ PHY/QHP: CPT | Mod: 25

## 2019-05-28 PROCEDURE — 96920 EXCIMER LSR PSRIASIS<250SQCM: CPT

## 2019-05-28 ASSESSMENT — LOCATION ZONE DERM
LOCATION ZONE: ARM
LOCATION ZONE: SCALP
LOCATION ZONE: SCALP
LOCATION ZONE: ARM

## 2019-05-28 NOTE — PROCEDURE: XTRAC LASER
Total Pulses (Will Not Render If 0): 0
Consent: Written consent obtained, risks reviewed including but not limited to crusting, scabbing, blistering, scarring, darker or lighter pigmentary change, incidental hair removal, bruising, and/or incomplete removal.
Comments: Increased dose on scalp by 10% and repeated dosage on elbow per her request.
Dose Setting #2 (Mj/Cm2): 972
% Increase/Decrease From Last Treatment: 10% increase on scalp
Post-Care Instructions: I reviewed with the patient in detail post-care instructions. Patient should stay away from the sun and wear sun protection until treated areas are fully healed.
Treatment Number: 19
% Increase/Decrease From Last Treatment: 10% increase on scalp 5% increase on R elbow
Location #1: scalp
Dose Setting #2 (Mj/Cm2): 924
Detail Level: Simple
Location #2: right elbow
Patient Id: 
Total Square Area In Cm2 (Required For Proper Billing): 92
Dose Setting #1 (Mj/Cm2): 1127
Treatment Number: 17
Dose Setting #1 (Mj/Cm2): 7436
Total Square Area In Cm2 (Required For Proper Billing): 89

## 2019-05-28 NOTE — PROCEDURE: XTRAC LASER
Total Pulses (Will Not Render If 0): 0
Location #1: scalp
Treatment Number: 19
Location #2: right elbow
Comments: Increased dose on scalp by 10% and repeated dosage on elbow per her request.
Dose Setting #2 (Mj/Cm2): 972
Patient Id: 
% Increase/Decrease From Last Treatment: 10% increase on scalp 5% increase on R elbow
Total Square Area In Cm2 (Required For Proper Billing): 92
Dose Setting #1 (Mj/Cm2): 3733
Post-Care Instructions: I reviewed with the patient in detail post-care instructions. Patient should stay away from the sun and wear sun protection until treated areas are fully healed.
Detail Level: Simple
Consent: Written consent obtained, risks reviewed including but not limited to crusting, scabbing, blistering, scarring, darker or lighter pigmentary change, incidental hair removal, bruising, and/or incomplete removal.
Dose Setting #1 (Mj/Cm2): 1123
Total Square Area In Cm2 (Required For Proper Billing): 89
Treatment Number: 17
% Increase/Decrease From Last Treatment: 10% increase on scalp
Dose Setting #2 (Mj/Cm2): 927

## 2019-06-03 ENCOUNTER — APPOINTMENT (RX ONLY)
Dept: URBAN - METROPOLITAN AREA CLINIC 71 | Facility: CLINIC | Age: 29
Setting detail: DERMATOLOGY
End: 2019-06-03

## 2019-06-03 ENCOUNTER — APPOINTMENT (RX ONLY)
Dept: URBAN - METROPOLITAN AREA CLINIC 70 | Facility: CLINIC | Age: 29
Setting detail: DERMATOLOGY
End: 2019-06-03

## 2019-06-03 DIAGNOSIS — L40.0 PSORIASIS VULGARIS: ICD-10-CM

## 2019-06-03 PROCEDURE — ? XTRAC LASER

## 2019-06-03 PROCEDURE — 99211 OFF/OP EST MAY X REQ PHY/QHP: CPT | Mod: 25

## 2019-06-03 PROCEDURE — 96920 EXCIMER LSR PSRIASIS<250SQCM: CPT

## 2019-06-03 ASSESSMENT — LOCATION ZONE DERM
LOCATION ZONE: ARM
LOCATION ZONE: ARM
LOCATION ZONE: SCALP
LOCATION ZONE: SCALP

## 2019-06-03 NOTE — PROCEDURE: XTRAC LASER
Consent: Written consent obtained, risks reviewed including but not limited to crusting, scabbing, blistering, scarring, darker or lighter pigmentary change, incidental hair removal, bruising, and/or incomplete removal.
Location #2: right elbow
% Increase/Decrease From Last Treatment: repeat on scalp 5% increase on R elbow
Post-Care Instructions: I reviewed with the patient in detail post-care instructions. Patient should stay away from the sun and wear sun protection until treated areas are fully healed.
Dose Setting #1 (Mj/Cm2): 3963
Location #1: scalp
Total Square Area In Cm2 (Required For Proper Billing): 84
Comments: Increased dose on scalp by 10% and repeated dosage on elbow per her request.
Dose Setting #2 (Mj/Cm2): 1021
Detail Level: Simple
Patient Id: 
Treatment Number: 20
Total Pulses (Will Not Render If 0): 0

## 2019-06-03 NOTE — PROCEDURE: XTRAC LASER
Dose Setting #1 (Mj/Cm2): 2206
Comments: Increased dose on scalp by 10% and repeated dosage on elbow per her request.
Treatment Number: 20
Post-Care Instructions: I reviewed with the patient in detail post-care instructions. Patient should stay away from the sun and wear sun protection until treated areas are fully healed.
Detail Level: Simple
Location #1: scalp
Patient Id: 
Total Pulses (Will Not Render If 0): 0
Location #2: right elbow
Total Square Area In Cm2 (Required For Proper Billing): 84
Dose Setting #2 (Mj/Cm2): 1021
Consent: Written consent obtained, risks reviewed including but not limited to crusting, scabbing, blistering, scarring, darker or lighter pigmentary change, incidental hair removal, bruising, and/or incomplete removal.
% Increase/Decrease From Last Treatment: repeat on scalp 5% increase on R elbow

## 2019-06-10 ENCOUNTER — APPOINTMENT (RX ONLY)
Dept: URBAN - METROPOLITAN AREA CLINIC 70 | Facility: CLINIC | Age: 29
Setting detail: DERMATOLOGY
End: 2019-06-10

## 2019-06-10 ENCOUNTER — APPOINTMENT (RX ONLY)
Dept: URBAN - METROPOLITAN AREA CLINIC 71 | Facility: CLINIC | Age: 29
Setting detail: DERMATOLOGY
End: 2019-06-10

## 2019-06-10 DIAGNOSIS — L40.0 PSORIASIS VULGARIS: ICD-10-CM

## 2019-06-10 PROCEDURE — 96920 EXCIMER LSR PSRIASIS<250SQCM: CPT

## 2019-06-10 PROCEDURE — ? XTRAC LASER

## 2019-06-10 PROCEDURE — 99211 OFF/OP EST MAY X REQ PHY/QHP: CPT | Mod: 25

## 2019-06-10 ASSESSMENT — LOCATION ZONE DERM
LOCATION ZONE: SCALP
LOCATION ZONE: ARM
LOCATION ZONE: SCALP
LOCATION ZONE: ARM

## 2019-06-10 NOTE — PROCEDURE: XTRAC LASER
Location #1: scalp
Total Energy In Joules: 159.05
Comments: Increased dose on scalp by 15% and repeated dosage on elbow per her request.
Patient Id: 
Total Square Area In Cm2 (Required For Proper Billing): 136
Detail Level: Simple
Post-Care Instructions: I reviewed with the patient in detail post-care instructions. Patient should stay away from the sun and wear sun protection until treated areas are fully healed.
Dose Setting #2 (Mj/Cm2): 1021
Location #2: right elbow
Treatment Number: 21
Total Pulses (Will Not Render If 0): 0
% Increase/Decrease From Last Treatment: increased 15% on scalp and maintained on the elbow
Dose Setting #1 (Mj/Cm2): 4246
Consent: Written consent obtained, risks reviewed including but not limited to crusting, scabbing, blistering, scarring, darker or lighter pigmentary change, incidental hair removal, bruising, and/or incomplete removal.

## 2019-06-14 ENCOUNTER — APPOINTMENT (RX ONLY)
Dept: URBAN - METROPOLITAN AREA CLINIC 71 | Facility: CLINIC | Age: 29
Setting detail: DERMATOLOGY
End: 2019-06-14

## 2019-06-14 ENCOUNTER — APPOINTMENT (RX ONLY)
Dept: URBAN - METROPOLITAN AREA CLINIC 70 | Facility: CLINIC | Age: 29
Setting detail: DERMATOLOGY
End: 2019-06-14

## 2019-06-14 DIAGNOSIS — L40.0 PSORIASIS VULGARIS: ICD-10-CM

## 2019-06-14 PROCEDURE — ? XTRAC LASER

## 2019-06-14 PROCEDURE — 96920 EXCIMER LSR PSRIASIS<250SQCM: CPT

## 2019-06-14 PROCEDURE — 99211 OFF/OP EST MAY X REQ PHY/QHP: CPT | Mod: 25

## 2019-06-14 ASSESSMENT — LOCATION ZONE DERM
LOCATION ZONE: ARM
LOCATION ZONE: SCALP
LOCATION ZONE: ARM
LOCATION ZONE: SCALP

## 2019-06-14 NOTE — PROCEDURE: XTRAC LASER
Treatment Number: 22
% Increase/Decrease From Last Treatment: increased 10% on scalp and maintained on the elbow Te
Total Square Area In Cm2 (Required For Proper Billing): 108
Location #2: right elbow
Patient Id: 
Post-Care Instructions: I reviewed with the patient in detail post-care instructions. Patient should stay away from the sun and wear sun protection until treated areas are fully healed.
Dose Setting #2 (Mj/Cm2): 1021
Consent: Written consent obtained, risks reviewed including but not limited to crusting, scabbing, blistering, scarring, darker or lighter pigmentary change, incidental hair removal, bruising, and/or incomplete removal.
Location #1: scalp
Dose Setting #1 (Mj/Cm2): 9027
Total Pulses (Will Not Render If 0): 0
Detail Level: Simple
Total Energy In Joules: 135.11

## 2019-06-14 NOTE — PROCEDURE: XTRAC LASER
Dose Setting #2 (Mj/Cm2): 1021
Total Pulses (Will Not Render If 0): 0
Total Energy In Joules: 135.11
Patient Id: 
Total Square Area In Cm2 (Required For Proper Billing): 108
Consent: Written consent obtained, risks reviewed including but not limited to crusting, scabbing, blistering, scarring, darker or lighter pigmentary change, incidental hair removal, bruising, and/or incomplete removal.
Detail Level: Simple
Treatment Number: 22
Location #1: scalp
% Increase/Decrease From Last Treatment: increased 10% on scalp and maintained on the elbow Te
Post-Care Instructions: I reviewed with the patient in detail post-care instructions. Patient should stay away from the sun and wear sun protection until treated areas are fully healed.
Dose Setting #1 (Mj/Cm2): 1851
Location #2: right elbow

## 2019-06-17 ENCOUNTER — APPOINTMENT (RX ONLY)
Dept: URBAN - METROPOLITAN AREA CLINIC 71 | Facility: CLINIC | Age: 29
Setting detail: DERMATOLOGY
End: 2019-06-17

## 2019-06-17 ENCOUNTER — APPOINTMENT (RX ONLY)
Dept: URBAN - METROPOLITAN AREA CLINIC 70 | Facility: CLINIC | Age: 29
Setting detail: DERMATOLOGY
End: 2019-06-17

## 2019-06-17 DIAGNOSIS — L40.0 PSORIASIS VULGARIS: ICD-10-CM

## 2019-06-17 PROCEDURE — 96920 EXCIMER LSR PSRIASIS<250SQCM: CPT

## 2019-06-17 PROCEDURE — 99211 OFF/OP EST MAY X REQ PHY/QHP: CPT | Mod: 25

## 2019-06-17 PROCEDURE — ? XTRAC LASER

## 2019-06-17 ASSESSMENT — LOCATION ZONE DERM
LOCATION ZONE: SCALP
LOCATION ZONE: SCALP
LOCATION ZONE: ARM
LOCATION ZONE: ARM

## 2019-06-17 NOTE — PROCEDURE: XTRAC LASER
Total Energy In Joules: 223.02
Total Square Area In Cm2 (Required For Proper Billing): 160
Location #2: right elbow
Dose Setting #1 (Mj/Cm2): 5897
Treatment Number: 23
Comments: Increased dose on scalp by 15% and repeated dosage on elbow per her request.
Dose Setting #2 (Mj/Cm2): 0313
Post-Care Instructions: I reviewed with the patient in detail post-care instructions. Patient should stay away from the sun and wear sun protection until treated areas are fully healed.
% Increase/Decrease From Last Treatment: increased 10% on scalp and increased by 5% on the elbow
Detail Level: Simple
Total Pulses (Will Not Render If 0): 0
Location #1: scalp
Consent: Written consent obtained, risks reviewed including but not limited to crusting, scabbing, blistering, scarring, darker or lighter pigmentary change, incidental hair removal, bruising, and/or incomplete removal.
Patient Id:

## 2019-06-21 ENCOUNTER — APPOINTMENT (RX ONLY)
Dept: URBAN - METROPOLITAN AREA CLINIC 70 | Facility: CLINIC | Age: 29
Setting detail: DERMATOLOGY
End: 2019-06-21

## 2019-06-21 ENCOUNTER — APPOINTMENT (RX ONLY)
Dept: URBAN - METROPOLITAN AREA CLINIC 71 | Facility: CLINIC | Age: 29
Setting detail: DERMATOLOGY
End: 2019-06-21

## 2019-06-21 DIAGNOSIS — L40.0 PSORIASIS VULGARIS: ICD-10-CM

## 2019-06-21 PROBLEM — D48.5 NEOPLASM OF UNCERTAIN BEHAVIOR OF SKIN: Status: ACTIVE | Noted: 2019-06-21

## 2019-06-21 PROCEDURE — 96920 EXCIMER LSR PSRIASIS<250SQCM: CPT

## 2019-06-21 PROCEDURE — ? XTRAC LASER

## 2019-06-21 ASSESSMENT — LOCATION ZONE DERM
LOCATION ZONE: SCALP
LOCATION ZONE: ARM
LOCATION ZONE: SCALP
LOCATION ZONE: ARM

## 2019-06-21 NOTE — PROCEDURE: XTRAC LASER
Location #1: scalp
Comments: Increased dose on scalp by 15% and repeated dosage on elbow per her request.
% Increase/Decrease From Last Treatment: increased 10% on scalp and increased by 5% on the elbow
Total Pulses (Will Not Render If 0): 0
Total Energy In Joules: 283.92
Dose Setting #1 (Mj/Cm2): 4464
Treatment Number: 24
Total Square Area In Cm2 (Required For Proper Billing): 184
Consent: Written consent obtained, risks reviewed including but not limited to crusting, scabbing, blistering, scarring, darker or lighter pigmentary change, incidental hair removal, bruising, and/or incomplete removal.
Post-Care Instructions: I reviewed with the patient in detail post-care instructions. Patient should stay away from the sun and wear sun protection until treated areas are fully healed.
Dose Setting #2 (Mj/Cm2): 1124
Detail Level: Simple
Location #2: right elbow
Patient Id:

## 2019-06-21 NOTE — PROCEDURE: XTRAC LASER
Treatment Number: 24
Location #2: right elbow
Location #1: scalp
Total Energy In Joules: 283.92
Dose Setting #2 (Mj/Cm2): 1124
Total Square Area In Cm2 (Required For Proper Billing): 184
Detail Level: Simple
Post-Care Instructions: I reviewed with the patient in detail post-care instructions. Patient should stay away from the sun and wear sun protection until treated areas are fully healed.
Patient Id: 
Comments: Increased dose on scalp by 15% and repeated dosage on elbow per her request.
Dose Setting #1 (Mj/Cm2): 5076
Consent: Written consent obtained, risks reviewed including but not limited to crusting, scabbing, blistering, scarring, darker or lighter pigmentary change, incidental hair removal, bruising, and/or incomplete removal.
Total Pulses (Will Not Render If 0): 0
% Increase/Decrease From Last Treatment: increased 10% on scalp and increased by 5% on the elbow

## 2019-06-26 ENCOUNTER — APPOINTMENT (RX ONLY)
Dept: URBAN - METROPOLITAN AREA CLINIC 71 | Facility: CLINIC | Age: 29
Setting detail: DERMATOLOGY
End: 2019-06-26

## 2019-06-26 ENCOUNTER — APPOINTMENT (RX ONLY)
Dept: URBAN - METROPOLITAN AREA CLINIC 70 | Facility: CLINIC | Age: 29
Setting detail: DERMATOLOGY
End: 2019-06-26

## 2019-06-26 DIAGNOSIS — L40.0 PSORIASIS VULGARIS: ICD-10-CM

## 2019-06-26 PROCEDURE — 96920 EXCIMER LSR PSRIASIS<250SQCM: CPT

## 2019-06-26 PROCEDURE — ? XTRAC LASER

## 2019-06-26 PROCEDURE — 99211 OFF/OP EST MAY X REQ PHY/QHP: CPT | Mod: 25

## 2019-06-26 ASSESSMENT — LOCATION ZONE DERM
LOCATION ZONE: SCALP
LOCATION ZONE: ARM
LOCATION ZONE: SCALP
LOCATION ZONE: ARM

## 2019-06-26 NOTE — PROCEDURE: XTRAC LASER
Consent: Written consent obtained, risks reviewed including but not limited to crusting, scabbing, blistering, scarring, darker or lighter pigmentary change, incidental hair removal, bruising, and/or incomplete removal.
Dose Setting #2 (Mj/Cm2): 7624
Detail Level: Simple
Total Square Area In Cm2 (Required For Proper Billing): 116
Treatment Number: 25
Location #2: right elbow
% Increase/Decrease From Last Treatment: increased 5% on scalp and increased by 5% on the elbow
Post-Care Instructions: I reviewed with the patient in detail post-care instructions. Patient should stay away from the sun and wear sun protection until treated areas are fully healed.
Patient Id: 
Location #1: scalp
Total Pulses (Will Not Render If 0): 0
Dose Setting #1 (Mj/Cm2): 1640

## 2019-06-26 NOTE — PROCEDURE: XTRAC LASER
Detail Level: Simple
Treatment Number: 25
% Increase/Decrease From Last Treatment: increased 5% on scalp and increased by 5% on the elbow
Total Pulses (Will Not Render If 0): 0
Consent: Written consent obtained, risks reviewed including but not limited to crusting, scabbing, blistering, scarring, darker or lighter pigmentary change, incidental hair removal, bruising, and/or incomplete removal.
Dose Setting #2 (Mj/Cm2): 4945
Total Square Area In Cm2 (Required For Proper Billing): 116
Location #1: scalp
Patient Id: 
Dose Setting #1 (Mj/Cm2): 1640
Location #2: right elbow
Post-Care Instructions: I reviewed with the patient in detail post-care instructions. Patient should stay away from the sun and wear sun protection until treated areas are fully healed.

## 2019-07-01 ENCOUNTER — APPOINTMENT (RX ONLY)
Dept: URBAN - METROPOLITAN AREA CLINIC 71 | Facility: CLINIC | Age: 29
Setting detail: DERMATOLOGY
End: 2019-07-01

## 2019-07-01 ENCOUNTER — APPOINTMENT (RX ONLY)
Dept: URBAN - METROPOLITAN AREA CLINIC 70 | Facility: CLINIC | Age: 29
Setting detail: DERMATOLOGY
End: 2019-07-01

## 2019-07-01 DIAGNOSIS — L40.0 PSORIASIS VULGARIS: ICD-10-CM

## 2019-07-01 PROCEDURE — ? XTRAC LASER

## 2019-07-01 PROCEDURE — 96920 EXCIMER LSR PSRIASIS<250SQCM: CPT

## 2019-07-01 ASSESSMENT — LOCATION ZONE DERM
LOCATION ZONE: ARM
LOCATION ZONE: SCALP
LOCATION ZONE: SCALP
LOCATION ZONE: ARM

## 2019-07-01 NOTE — PROCEDURE: XTRAC LASER
Post-Care Instructions: I reviewed with the patient in detail post-care instructions. Patient should stay away from the sun and wear sun protection until treated areas are fully healed.
Consent: Written consent obtained, risks reviewed including but not limited to crusting, scabbing, blistering, scarring, darker or lighter pigmentary change, incidental hair removal, bruising, and/or incomplete removal.
Total Pulses (Will Not Render If 0): 0
Total Square Area In Cm2 (Required For Proper Billing): 132
Location #2: right elbow
Patient Id: 
Dose Setting #2 (Mj/Cm2): 9266
Dose Setting #1 (Mj/Cm2): 6778
% Increase/Decrease From Last Treatment: increased 5% on scalp and increased by 5% on the elbow
Treatment Number: 26
Location #1: scalp
Detail Level: Simple

## 2019-07-01 NOTE — PROCEDURE: XTRAC LASER
Detail Level: Simple
Post-Care Instructions: I reviewed with the patient in detail post-care instructions. Patient should stay away from the sun and wear sun protection until treated areas are fully healed.
Total Pulses (Will Not Render If 0): 0
Dose Setting #1 (Mj/Cm2): 7157
Location #1: scalp
Total Square Area In Cm2 (Required For Proper Billing): 132
Consent: Written consent obtained, risks reviewed including but not limited to crusting, scabbing, blistering, scarring, darker or lighter pigmentary change, incidental hair removal, bruising, and/or incomplete removal.
Dose Setting #2 (Mj/Cm2): 4050
% Increase/Decrease From Last Treatment: increased 5% on scalp and increased by 5% on the elbow
Patient Id: 
Treatment Number: 26
Location #2: right elbow

## 2019-07-08 ENCOUNTER — APPOINTMENT (RX ONLY)
Dept: URBAN - METROPOLITAN AREA CLINIC 70 | Facility: CLINIC | Age: 29
Setting detail: DERMATOLOGY
End: 2019-07-08

## 2019-07-08 ENCOUNTER — APPOINTMENT (RX ONLY)
Dept: URBAN - METROPOLITAN AREA CLINIC 71 | Facility: CLINIC | Age: 29
Setting detail: DERMATOLOGY
End: 2019-07-08

## 2019-07-08 DIAGNOSIS — L40.0 PSORIASIS VULGARIS: ICD-10-CM

## 2019-07-08 PROCEDURE — 99211 OFF/OP EST MAY X REQ PHY/QHP: CPT | Mod: 25

## 2019-07-08 PROCEDURE — ? XTRAC LASER

## 2019-07-08 PROCEDURE — 96920 EXCIMER LSR PSRIASIS<250SQCM: CPT

## 2019-07-08 ASSESSMENT — LOCATION ZONE DERM
LOCATION ZONE: ARM
LOCATION ZONE: ARM
LOCATION ZONE: SCALP
LOCATION ZONE: SCALP

## 2019-07-08 NOTE — PROCEDURE: XTRAC LASER
Total Energy In Joules: 236.64
Dose Setting #1 (Mj/Cm2): 9372
Post-Care Instructions: I reviewed with the patient in detail post-care instructions. Patient should stay away from the sun and wear sun protection until treated areas are fully healed.
Location #1: scalp
Consent: Written consent obtained, risks reviewed including but not limited to crusting, scabbing, blistering, scarring, darker or lighter pigmentary change, incidental hair removal, bruising, and/or incomplete removal.
Patient Id: 
Treatment Number: 27
Total Pulses (Will Not Render If 0): 0
Total Square Area In Cm2 (Required For Proper Billing): 132
% Increase/Decrease From Last Treatment: increased 5% on scalp and increased by 5% on the elbow
Location #2: right elbow
Detail Level: Simple
Dose Setting #2 (Mj/Cm2): 1764

## 2019-07-08 NOTE — PROCEDURE: XTRAC LASER
Total Square Area In Cm2 (Required For Proper Billing): 132
Location #1: scalp
Patient Id: 
Treatment Number: 27
Dose Setting #1 (Mj/Cm2): 0407
Dose Setting #2 (Mj/Cm2): 0026
% Increase/Decrease From Last Treatment: increased 5% on scalp and increased by 5% on the elbow
Post-Care Instructions: I reviewed with the patient in detail post-care instructions. Patient should stay away from the sun and wear sun protection until treated areas are fully healed.
Total Energy In Joules: 236.64
Location #2: right elbow
Total Pulses (Will Not Render If 0): 0
Detail Level: Simple
Consent: Written consent obtained, risks reviewed including but not limited to crusting, scabbing, blistering, scarring, darker or lighter pigmentary change, incidental hair removal, bruising, and/or incomplete removal.

## 2019-07-12 ENCOUNTER — APPOINTMENT (RX ONLY)
Dept: URBAN - METROPOLITAN AREA CLINIC 71 | Facility: CLINIC | Age: 29
Setting detail: DERMATOLOGY
End: 2019-07-12

## 2019-07-12 ENCOUNTER — APPOINTMENT (RX ONLY)
Dept: URBAN - METROPOLITAN AREA CLINIC 70 | Facility: CLINIC | Age: 29
Setting detail: DERMATOLOGY
End: 2019-07-12

## 2019-07-12 DIAGNOSIS — L40.0 PSORIASIS VULGARIS: ICD-10-CM

## 2019-07-12 PROBLEM — D48.5 NEOPLASM OF UNCERTAIN BEHAVIOR OF SKIN: Status: ACTIVE | Noted: 2019-07-12

## 2019-07-12 PROCEDURE — 96920 EXCIMER LSR PSRIASIS<250SQCM: CPT

## 2019-07-12 PROCEDURE — ? XTRAC LASER

## 2019-07-12 PROCEDURE — 99211 OFF/OP EST MAY X REQ PHY/QHP: CPT | Mod: 25

## 2019-07-12 ASSESSMENT — LOCATION ZONE DERM
LOCATION ZONE: SCALP
LOCATION ZONE: ARM
LOCATION ZONE: ARM
LOCATION ZONE: SCALP

## 2019-07-12 NOTE — PROCEDURE: XTRAC LASER
Patient Id: 
% Increase/Decrease From Last Treatment: increased 5% on scalp and increased by 5% on the elbow
Detail Level: Simple
Total Pulses (Will Not Render If 0): 0
Dose Setting #2 (Mj/Cm2): 1721
Total Square Area In Cm2 (Required For Proper Billing): 48
Dose Setting #1 (Mj/Cm2): 7041
Location #2: right elbow
Consent: Written consent obtained, risks reviewed including but not limited to crusting, scabbing, blistering, scarring, darker or lighter pigmentary change, incidental hair removal, bruising, and/or incomplete removal.
Post-Care Instructions: I reviewed with the patient in detail post-care instructions. Patient should stay away from the sun and wear sun protection until treated areas are fully healed.
Treatment Number: 28
Location #1: scalp

## 2019-07-12 NOTE — PROCEDURE: XTRAC LASER
Location #1: scalp
Total Pulses (Will Not Render If 0): 0
Dose Setting #2 (Mj/Cm2): 9202
Detail Level: Simple
Post-Care Instructions: I reviewed with the patient in detail post-care instructions. Patient should stay away from the sun and wear sun protection until treated areas are fully healed.
Total Square Area In Cm2 (Required For Proper Billing): 48
Patient Id: 
Location #2: right elbow
Dose Setting #1 (Mj/Cm2): 4007
Treatment Number: 28
% Increase/Decrease From Last Treatment: increased 5% on scalp and increased by 5% on the elbow
Consent: Written consent obtained, risks reviewed including but not limited to crusting, scabbing, blistering, scarring, darker or lighter pigmentary change, incidental hair removal, bruising, and/or incomplete removal.

## 2019-07-15 ENCOUNTER — APPOINTMENT (RX ONLY)
Dept: URBAN - METROPOLITAN AREA CLINIC 71 | Facility: CLINIC | Age: 29
Setting detail: DERMATOLOGY
End: 2019-07-15

## 2019-07-15 ENCOUNTER — APPOINTMENT (RX ONLY)
Dept: URBAN - METROPOLITAN AREA CLINIC 70 | Facility: CLINIC | Age: 29
Setting detail: DERMATOLOGY
End: 2019-07-15

## 2019-07-15 DIAGNOSIS — L40.0 PSORIASIS VULGARIS: ICD-10-CM

## 2019-07-15 PROBLEM — L29.8 OTHER PRURITUS: Status: ACTIVE | Noted: 2019-07-15

## 2019-07-15 PROCEDURE — ? XTRAC LASER

## 2019-07-15 PROCEDURE — 96920 EXCIMER LSR PSRIASIS<250SQCM: CPT

## 2019-07-15 PROCEDURE — 99211 OFF/OP EST MAY X REQ PHY/QHP: CPT | Mod: 25

## 2019-07-15 ASSESSMENT — LOCATION ZONE DERM
LOCATION ZONE: ARM
LOCATION ZONE: SCALP
LOCATION ZONE: ARM
LOCATION ZONE: SCALP

## 2019-07-15 NOTE — PROCEDURE: XTRAC LASER
Detail Level: Simple
Dose Setting #1 (Mj/Cm2): 1993
Patient Id: 
Post-Care Instructions: I reviewed with the patient in detail post-care instructions. Patient should stay away from the sun and wear sun protection until treated areas are fully healed.
Consent: Written consent obtained, risks reviewed including but not limited to crusting, scabbing, blistering, scarring, darker or lighter pigmentary change, incidental hair removal, bruising, and/or incomplete removal.
Location #2: right elbow
Location #1: scalp
Treatment Number: 29
Dose Setting #2 (Mj/Cm2): 4441
% Increase/Decrease From Last Treatment: increased 5% on scalp and increased by 5% on the elbow
Total Square Area In Cm2 (Required For Proper Billing): 72
Total Pulses (Will Not Render If 0): 0

## 2019-07-15 NOTE — PROCEDURE: XTRAC LASER
Treatment Number: 29
Total Square Area In Cm2 (Required For Proper Billing): 72
% Increase/Decrease From Last Treatment: increased 5% on scalp and increased by 5% on the elbow
Dose Setting #1 (Mj/Cm2): 1993
Post-Care Instructions: I reviewed with the patient in detail post-care instructions. Patient should stay away from the sun and wear sun protection until treated areas are fully healed.
Location #1: scalp
Detail Level: Simple
Patient Id: 
Location #2: right elbow
Dose Setting #2 (Mj/Cm2): 7879
Consent: Written consent obtained, risks reviewed including but not limited to crusting, scabbing, blistering, scarring, darker or lighter pigmentary change, incidental hair removal, bruising, and/or incomplete removal.
Total Pulses (Will Not Render If 0): 0

## 2019-07-19 ENCOUNTER — APPOINTMENT (RX ONLY)
Dept: URBAN - METROPOLITAN AREA CLINIC 71 | Facility: CLINIC | Age: 29
Setting detail: DERMATOLOGY
End: 2019-07-19

## 2019-07-19 ENCOUNTER — APPOINTMENT (RX ONLY)
Dept: URBAN - METROPOLITAN AREA CLINIC 70 | Facility: CLINIC | Age: 29
Setting detail: DERMATOLOGY
End: 2019-07-19

## 2019-07-19 DIAGNOSIS — L40.0 PSORIASIS VULGARIS: ICD-10-CM

## 2019-07-19 PROCEDURE — 99211 OFF/OP EST MAY X REQ PHY/QHP: CPT | Mod: 25

## 2019-07-19 PROCEDURE — ? XTRAC LASER

## 2019-07-19 PROCEDURE — 96920 EXCIMER LSR PSRIASIS<250SQCM: CPT

## 2019-07-19 ASSESSMENT — LOCATION ZONE DERM
LOCATION ZONE: ARM
LOCATION ZONE: SCALP
LOCATION ZONE: SCALP
LOCATION ZONE: ARM

## 2019-07-19 NOTE — PROCEDURE: XTRAC LASER
Post-Care Instructions: I reviewed with the patient in detail post-care instructions. Patient should stay away from the sun and wear sun protection until treated areas are fully healed.
Detail Level: Simple
Total Square Area In Cm2 (Required For Proper Billing): 64
Location #2: right elbow
Patient Id: 
% Increase/Decrease From Last Treatment: increased 5% on scalp and increased by 5% on the elbow
Total Pulses (Will Not Render If 0): 0
Consent: Written consent obtained, risks reviewed including but not limited to crusting, scabbing, blistering, scarring, darker or lighter pigmentary change, incidental hair removal, bruising, and/or incomplete removal.
Dose Setting #2 (Mj/Cm2): 2664
Dose Setting #1 (Mj/Cm2): 8666
Treatment Number: 30
Location #1: scalp

## 2019-07-19 NOTE — PROCEDURE: XTRAC LASER
Consent: Written consent obtained, risks reviewed including but not limited to crusting, scabbing, blistering, scarring, darker or lighter pigmentary change, incidental hair removal, bruising, and/or incomplete removal.
Total Pulses (Will Not Render If 0): 0
Treatment Number: 30
Total Square Area In Cm2 (Required For Proper Billing): 64
% Increase/Decrease From Last Treatment: increased 5% on scalp and increased by 5% on the elbow
Dose Setting #2 (Mj/Cm2): 8984
Post-Care Instructions: I reviewed with the patient in detail post-care instructions. Patient should stay away from the sun and wear sun protection until treated areas are fully healed.
Location #1: scalp
Detail Level: Simple
Location #2: right elbow
Dose Setting #1 (Mj/Cm2): 0715
Patient Id:

## 2019-07-22 ENCOUNTER — APPOINTMENT (RX ONLY)
Dept: URBAN - METROPOLITAN AREA CLINIC 71 | Facility: CLINIC | Age: 29
Setting detail: DERMATOLOGY
End: 2019-07-22

## 2019-07-22 ENCOUNTER — APPOINTMENT (RX ONLY)
Dept: URBAN - METROPOLITAN AREA CLINIC 70 | Facility: CLINIC | Age: 29
Setting detail: DERMATOLOGY
End: 2019-07-22

## 2019-07-22 DIAGNOSIS — L40.0 PSORIASIS VULGARIS: ICD-10-CM

## 2019-07-22 PROBLEM — L29.8 OTHER PRURITUS: Status: ACTIVE | Noted: 2019-07-22

## 2019-07-22 PROCEDURE — ? XTRAC LASER

## 2019-07-22 PROCEDURE — 99211 OFF/OP EST MAY X REQ PHY/QHP: CPT | Mod: 25

## 2019-07-22 PROCEDURE — 96920 EXCIMER LSR PSRIASIS<250SQCM: CPT

## 2019-07-22 ASSESSMENT — LOCATION ZONE DERM
LOCATION ZONE: SCALP
LOCATION ZONE: ARM
LOCATION ZONE: ARM
LOCATION ZONE: SCALP

## 2019-07-22 NOTE — PROCEDURE: XTRAC LASER
Treatment Number: 31
Location #2: right elbow
Location #1: scalp
Total Pulses (Will Not Render If 0): 0
Dose Setting #1 (Mj/Cm2): 1373
Consent: Written consent obtained, risks reviewed including but not limited to crusting, scabbing, blistering, scarring, darker or lighter pigmentary change, incidental hair removal, bruising, and/or incomplete removal.
Dose Setting #2 (Mj/Cm2): 1478
Detail Level: Simple
Patient Id: 
% Increase/Decrease From Last Treatment: increased 5% on scalp and increased by 5% on the elbow GB
Post-Care Instructions: I reviewed with the patient in detail post-care instructions. Patient should stay away from the sun and wear sun protection until treated areas are fully healed.
Total Square Area In Cm2 (Required For Proper Billing): 68

## 2019-07-22 NOTE — PROCEDURE: XTRAC LASER
Location #1: scalp
Post-Care Instructions: I reviewed with the patient in detail post-care instructions. Patient should stay away from the sun and wear sun protection until treated areas are fully healed.
Dose Setting #2 (Mj/Cm2): 9915
Patient Id: 
Detail Level: Simple
Total Pulses (Will Not Render If 0): 0
Treatment Number: 31
Dose Setting #1 (Mj/Cm2): 8726
Total Square Area In Cm2 (Required For Proper Billing): 68
% Increase/Decrease From Last Treatment: increased 5% on scalp and increased by 5% on the elbow GB
Location #2: right elbow
Consent: Written consent obtained, risks reviewed including but not limited to crusting, scabbing, blistering, scarring, darker or lighter pigmentary change, incidental hair removal, bruising, and/or incomplete removal.

## 2019-08-02 ENCOUNTER — APPOINTMENT (RX ONLY)
Dept: URBAN - METROPOLITAN AREA CLINIC 70 | Facility: CLINIC | Age: 29
Setting detail: DERMATOLOGY
End: 2019-08-02

## 2019-08-02 ENCOUNTER — APPOINTMENT (RX ONLY)
Dept: URBAN - METROPOLITAN AREA CLINIC 71 | Facility: CLINIC | Age: 29
Setting detail: DERMATOLOGY
End: 2019-08-02

## 2019-08-02 DIAGNOSIS — L40.0 PSORIASIS VULGARIS: ICD-10-CM

## 2019-08-02 PROCEDURE — 96920 EXCIMER LSR PSRIASIS<250SQCM: CPT

## 2019-08-02 PROCEDURE — ? XTRAC LASER

## 2019-08-02 ASSESSMENT — LOCATION ZONE DERM
LOCATION ZONE: SCALP
LOCATION ZONE: SCALP
LOCATION ZONE: ARM
LOCATION ZONE: ARM

## 2019-08-02 NOTE — PROCEDURE: XTRAC LASER
Dose Setting #1 (Mj/Cm2): 1917
Total Pulses (Will Not Render If 0): 0
Post-Care Instructions: I reviewed with the patient in detail post-care instructions. Patient should stay away from the sun and wear sun protection until treated areas are fully healed.
Total Square Area In Cm2 (Required For Proper Billing): 68
Treatment Number: 32
Patient Id: 
Location #1: scalp
% Increase/Decrease From Last Treatment: rpt on scalp and rpt on the elbow Ed
Consent: Written consent obtained, risks reviewed including but not limited to crusting, scabbing, blistering, scarring, darker or lighter pigmentary change, incidental hair removal, bruising, and/or incomplete removal.
Detail Level: Simple
Dose Setting #2 (Mj/Cm2): 3045
Location #2: right elbow

## 2019-08-02 NOTE — PROCEDURE: XTRAC LASER
Consent: Written consent obtained, risks reviewed including but not limited to crusting, scabbing, blistering, scarring, darker or lighter pigmentary change, incidental hair removal, bruising, and/or incomplete removal.
Dose Setting #1 (Mj/Cm2): 4022
Dose Setting #2 (Mj/Cm2): 1011
Total Square Area In Cm2 (Required For Proper Billing): 68
Location #1: scalp
Treatment Number: 32
Total Pulses (Will Not Render If 0): 0
% Increase/Decrease From Last Treatment: rpt on scalp and rpt on the elbow Ed
Patient Id: 
Post-Care Instructions: I reviewed with the patient in detail post-care instructions. Patient should stay away from the sun and wear sun protection until treated areas are fully healed.
Detail Level: Simple
Location #2: right elbow

## 2019-08-28 ENCOUNTER — APPOINTMENT (RX ONLY)
Dept: URBAN - METROPOLITAN AREA CLINIC 71 | Facility: CLINIC | Age: 29
Setting detail: DERMATOLOGY
End: 2019-08-28

## 2019-08-28 ENCOUNTER — APPOINTMENT (RX ONLY)
Dept: URBAN - METROPOLITAN AREA CLINIC 70 | Facility: CLINIC | Age: 29
Setting detail: DERMATOLOGY
End: 2019-08-28

## 2019-08-28 DIAGNOSIS — L40.0 PSORIASIS VULGARIS: ICD-10-CM

## 2019-08-28 PROCEDURE — ? XTRAC LASER

## 2019-08-28 PROCEDURE — 99211 OFF/OP EST MAY X REQ PHY/QHP: CPT | Mod: 25

## 2019-08-28 PROCEDURE — 96920 EXCIMER LSR PSRIASIS<250SQCM: CPT

## 2019-08-28 ASSESSMENT — LOCATION ZONE DERM
LOCATION ZONE: SCALP
LOCATION ZONE: SCALP

## 2019-08-28 ASSESSMENT — LOCATION DETAILED DESCRIPTION DERM
LOCATION DETAILED: RIGHT SUPERIOR PARIETAL SCALP
LOCATION DETAILED: RIGHT SUPERIOR PARIETAL SCALP

## 2019-08-28 ASSESSMENT — LOCATION SIMPLE DESCRIPTION DERM
LOCATION SIMPLE: SCALP
LOCATION SIMPLE: SCALP

## 2019-08-28 NOTE — PROCEDURE: XTRAC LASER
Consent: Written consent obtained, risks reviewed including but not limited to crusting, scabbing, blistering, scarring, darker or lighter pigmentary change, incidental hair removal, bruising, and/or incomplete removal.
% Increase/Decrease From Last Treatment: decreased 50%
Treatment Number: 33
Dose Setting #1 (Mj/Cm2): 3185
Post-Care Instructions: I reviewed with the patient in detail post-care instructions. Patient should stay away from the sun and wear sun protection until treated areas are fully healed.
Patient Id: 
Total Square Area In Cm2 (Required For Proper Billing): 92
Total Energy In Joules: 101.11
Detail Level: Simple
Total Pulses (Will Not Render If 0): 0
Comments: Decreased today by 50% due to missing treatments. Ma
Location #1: scalp

## 2019-08-28 NOTE — PROCEDURE: XTRAC LASER
Location #1: scalp
Comments: Decreased today by 50% due to missing treatments. Ma
Consent: Written consent obtained, risks reviewed including but not limited to crusting, scabbing, blistering, scarring, darker or lighter pigmentary change, incidental hair removal, bruising, and/or incomplete removal.
Total Energy In Joules: 101.11
Total Square Area In Cm2 (Required For Proper Billing): 92
Post-Care Instructions: I reviewed with the patient in detail post-care instructions. Patient should stay away from the sun and wear sun protection until treated areas are fully healed.
% Increase/Decrease From Last Treatment: decreased 50%
Total Pulses (Will Not Render If 0): 0
Patient Id: 
Treatment Number: 33
Detail Level: Simple
Dose Setting #1 (Mj/Cm2): 4843

## 2019-09-03 ENCOUNTER — APPOINTMENT (RX ONLY)
Dept: URBAN - METROPOLITAN AREA CLINIC 70 | Facility: CLINIC | Age: 29
Setting detail: DERMATOLOGY
End: 2019-09-03

## 2019-09-03 ENCOUNTER — APPOINTMENT (RX ONLY)
Dept: URBAN - METROPOLITAN AREA CLINIC 71 | Facility: CLINIC | Age: 29
Setting detail: DERMATOLOGY
End: 2019-09-03

## 2019-09-03 DIAGNOSIS — L40.0 PSORIASIS VULGARIS: ICD-10-CM

## 2019-09-03 PROCEDURE — 96920 EXCIMER LSR PSRIASIS<250SQCM: CPT

## 2019-09-03 PROCEDURE — 99211 OFF/OP EST MAY X REQ PHY/QHP: CPT | Mod: 25

## 2019-09-03 PROCEDURE — ? XTRAC LASER

## 2019-09-03 ASSESSMENT — LOCATION DETAILED DESCRIPTION DERM
LOCATION DETAILED: RIGHT SUPERIOR PARIETAL SCALP
LOCATION DETAILED: RIGHT SUPERIOR PARIETAL SCALP

## 2019-09-03 ASSESSMENT — LOCATION SIMPLE DESCRIPTION DERM
LOCATION SIMPLE: SCALP
LOCATION SIMPLE: SCALP

## 2019-09-03 ASSESSMENT — LOCATION ZONE DERM
LOCATION ZONE: SCALP
LOCATION ZONE: SCALP

## 2019-09-03 NOTE — PROCEDURE: XTRAC LASER
Total Square Area In Cm2 (Required For Proper Billing): 133
Location #2: r elbow
Total Energy In Joules: 104
% Increase/Decrease From Last Treatment: increased 15% on scalp decreased 15% on r elbow
Detail Level: Simple
Comments: Decreased today by 50% due to missing treatments. Ma
Consent: Written consent obtained, risks reviewed including but not limited to crusting, scabbing, blistering, scarring, darker or lighter pigmentary change, incidental hair removal, bruising, and/or incomplete removal.
Total Pulses (Will Not Render If 0): 0
Patient Id: 
Treatment Number: 34
Post-Care Instructions: I reviewed with the patient in detail post-care instructions. Patient should stay away from the sun and wear sun protection until treated areas are fully healed.
Dose Setting #1 (Mj/Cm2): 8397
Location #1: scalp
Dose Setting #2 (Mj/Cm2): 4821

## 2019-09-03 NOTE — PROCEDURE: XTRAC LASER
Detail Level: Simple
Comments: Decreased today by 50% due to missing treatments. Ma
Location #1: scalp
Consent: Written consent obtained, risks reviewed including but not limited to crusting, scabbing, blistering, scarring, darker or lighter pigmentary change, incidental hair removal, bruising, and/or incomplete removal.
Total Energy In Joules: 104
Treatment Number: 34
% Increase/Decrease From Last Treatment: increased 15% on scalp decreased 15% on r elbow
Total Pulses (Will Not Render If 0): 0
Dose Setting #2 (Mj/Cm2): 4787
Patient Id: 
Post-Care Instructions: I reviewed with the patient in detail post-care instructions. Patient should stay away from the sun and wear sun protection until treated areas are fully healed.
Location #2: r elbow
Total Square Area In Cm2 (Required For Proper Billing): 133
Dose Setting #1 (Mj/Cm2): 5207

## 2019-09-06 ENCOUNTER — APPOINTMENT (RX ONLY)
Dept: URBAN - METROPOLITAN AREA CLINIC 70 | Facility: CLINIC | Age: 29
Setting detail: DERMATOLOGY
End: 2019-09-06

## 2019-09-06 ENCOUNTER — APPOINTMENT (RX ONLY)
Dept: URBAN - METROPOLITAN AREA CLINIC 71 | Facility: CLINIC | Age: 29
Setting detail: DERMATOLOGY
End: 2019-09-06

## 2019-09-06 DIAGNOSIS — L40.0 PSORIASIS VULGARIS: ICD-10-CM

## 2019-09-06 PROCEDURE — ? XTRAC LASER

## 2019-09-06 PROCEDURE — 96920 EXCIMER LSR PSRIASIS<250SQCM: CPT

## 2019-09-06 ASSESSMENT — LOCATION DETAILED DESCRIPTION DERM
LOCATION DETAILED: RIGHT SUPERIOR PARIETAL SCALP
LOCATION DETAILED: RIGHT SUPERIOR PARIETAL SCALP

## 2019-09-06 ASSESSMENT — LOCATION ZONE DERM
LOCATION ZONE: SCALP
LOCATION ZONE: SCALP

## 2019-09-06 ASSESSMENT — LOCATION SIMPLE DESCRIPTION DERM
LOCATION SIMPLE: SCALP
LOCATION SIMPLE: SCALP

## 2019-09-06 NOTE — PROCEDURE: XTRAC LASER
Consent: Written consent obtained, risks reviewed including but not limited to crusting, scabbing, blistering, scarring, darker or lighter pigmentary change, incidental hair removal, bruising, and/or incomplete removal.
Total Square Area In Cm2 (Required For Proper Billing): 168
Detail Level: Simple
% Increase/Decrease From Last Treatment: increased 15% on scalp. did not treat elbow due to redness.
Total Energy In Joules: 116
Dose Setting #1 (Mj/Cm2): 3318
Treatment Number: 35
Total Pulses (Will Not Render If 0): 0
Post-Care Instructions: I reviewed with the patient in detail post-care instructions. Patient should stay away from the sun and wear sun protection until treated areas are fully healed.
Location #1: scalp
Comments: Decreased today by 50% due to missing treatments. Ma
Dose Setting #2 (Mj/Cm2): did not treat today
Location #2: r elbow
Patient Id:

## 2019-09-16 ENCOUNTER — APPOINTMENT (RX ONLY)
Dept: URBAN - METROPOLITAN AREA CLINIC 71 | Facility: CLINIC | Age: 29
Setting detail: DERMATOLOGY
End: 2019-09-16

## 2019-09-16 ENCOUNTER — APPOINTMENT (RX ONLY)
Dept: URBAN - METROPOLITAN AREA CLINIC 70 | Facility: CLINIC | Age: 29
Setting detail: DERMATOLOGY
End: 2019-09-16

## 2019-09-16 DIAGNOSIS — L40.0 PSORIASIS VULGARIS: ICD-10-CM

## 2019-09-16 PROCEDURE — ? XTRAC LASER

## 2019-09-16 PROCEDURE — 99211 OFF/OP EST MAY X REQ PHY/QHP: CPT | Mod: 25

## 2019-09-16 PROCEDURE — 96920 EXCIMER LSR PSRIASIS<250SQCM: CPT

## 2019-09-16 ASSESSMENT — LOCATION DETAILED DESCRIPTION DERM
LOCATION DETAILED: RIGHT SUPERIOR PARIETAL SCALP
LOCATION DETAILED: RIGHT SUPERIOR PARIETAL SCALP

## 2019-09-16 ASSESSMENT — LOCATION ZONE DERM
LOCATION ZONE: SCALP
LOCATION ZONE: SCALP

## 2019-09-16 ASSESSMENT — LOCATION SIMPLE DESCRIPTION DERM
LOCATION SIMPLE: SCALP
LOCATION SIMPLE: SCALP

## 2019-09-16 NOTE — PROCEDURE: XTRAC LASER
Total Energy In Joules: 108.35
Total Square Area In Cm2 (Required For Proper Billing): 76
Post-Care Instructions: I reviewed with the patient in detail post-care instructions. Patient should stay away from the sun and wear sun protection until treated areas are fully healed.
Total Pulses (Will Not Render If 0): 0
Location #1: Scalp
Treatment Number: 36
Dose Setting #2 (Mj/Cm2): 953
Dose Setting #1 (Mj/Cm2): 8687
Consent: Written consent obtained, risks reviewed including but not limited to crusting, scabbing, blistering, scarring, darker or lighter pigmentary change, incidental hair removal, bruising, and/or incomplete removal.
Detail Level: Simple
Location #2: Right Elbow
Patient Id: 
% Increase/Decrease From Last Treatment: increased 15% on scalp. did not treat elbow due to redness.
Comments: Decreased today by 40%  on the Right elbow as she has not been treated in 3 weeks. Ma

## 2019-09-16 NOTE — PROCEDURE: XTRAC LASER
% Increase/Decrease From Last Treatment: increased 15% on scalp. did not treat elbow due to redness.
Total Pulses (Will Not Render If 0): 0
Comments: Decreased today by 40%  on the Right elbow as she has not been treated in 3 weeks. Ma
Dose Setting #1 (Mj/Cm2): 9860
Location #1: Scalp
Detail Level: Simple
Treatment Number: 36
Location #2: Right Elbow
Total Square Area In Cm2 (Required For Proper Billing): 76
Dose Setting #2 (Mj/Cm2): 954
Total Energy In Joules: 108.35
Patient Id: 
Post-Care Instructions: I reviewed with the patient in detail post-care instructions. Patient should stay away from the sun and wear sun protection until treated areas are fully healed.
Consent: Written consent obtained, risks reviewed including but not limited to crusting, scabbing, blistering, scarring, darker or lighter pigmentary change, incidental hair removal, bruising, and/or incomplete removal.

## 2019-09-20 ENCOUNTER — APPOINTMENT (RX ONLY)
Dept: URBAN - METROPOLITAN AREA CLINIC 71 | Facility: CLINIC | Age: 29
Setting detail: DERMATOLOGY
End: 2019-09-20

## 2019-09-20 ENCOUNTER — APPOINTMENT (RX ONLY)
Dept: URBAN - METROPOLITAN AREA CLINIC 70 | Facility: CLINIC | Age: 29
Setting detail: DERMATOLOGY
End: 2019-09-20

## 2019-09-20 DIAGNOSIS — L40.0 PSORIASIS VULGARIS: ICD-10-CM

## 2019-09-20 PROCEDURE — 99211 OFF/OP EST MAY X REQ PHY/QHP: CPT | Mod: 25

## 2019-09-20 PROCEDURE — ? XTRAC LASER

## 2019-09-20 PROCEDURE — 96920 EXCIMER LSR PSRIASIS<250SQCM: CPT

## 2019-09-20 ASSESSMENT — LOCATION ZONE DERM
LOCATION ZONE: SCALP
LOCATION ZONE: SCALP

## 2019-09-20 ASSESSMENT — LOCATION DETAILED DESCRIPTION DERM
LOCATION DETAILED: RIGHT SUPERIOR PARIETAL SCALP
LOCATION DETAILED: RIGHT SUPERIOR PARIETAL SCALP

## 2019-09-20 ASSESSMENT — LOCATION SIMPLE DESCRIPTION DERM
LOCATION SIMPLE: SCALP
LOCATION SIMPLE: SCALP

## 2019-09-20 NOTE — PROCEDURE: XTRAC LASER
Detail Level: Simple
Post-Care Instructions: I reviewed with the patient in detail post-care instructions. Patient should stay away from the sun and wear sun protection until treated areas are fully healed.
Total Pulses (Will Not Render If 0): 0
Treatment Number: 37
Patient Id: 
Consent: Written consent obtained, risks reviewed including but not limited to crusting, scabbing, blistering, scarring, darker or lighter pigmentary change, incidental hair removal, bruising, and/or incomplete removal.
Dose Setting #2 (Mj/Cm2): 957
% Increase/Decrease From Last Treatment: Increased 15% on the scalp, repeated dose to the elbow
Total Square Area In Cm2 (Required For Proper Billing): 84
Comments: Treated today by TE
Dose Setting #1 (Mj/Cm2): 1853
Total Energy In Joules: 128.98
Location #1: Scalp
Location #2: Right Elbow

## 2019-09-23 ENCOUNTER — APPOINTMENT (RX ONLY)
Dept: URBAN - METROPOLITAN AREA CLINIC 71 | Facility: CLINIC | Age: 29
Setting detail: DERMATOLOGY
End: 2019-09-23

## 2019-09-23 ENCOUNTER — APPOINTMENT (RX ONLY)
Dept: URBAN - METROPOLITAN AREA CLINIC 70 | Facility: CLINIC | Age: 29
Setting detail: DERMATOLOGY
End: 2019-09-23

## 2019-09-23 DIAGNOSIS — L40.0 PSORIASIS VULGARIS: ICD-10-CM

## 2019-09-23 PROCEDURE — 99211 OFF/OP EST MAY X REQ PHY/QHP: CPT | Mod: 25

## 2019-09-23 PROCEDURE — 96920 EXCIMER LSR PSRIASIS<250SQCM: CPT

## 2019-09-23 PROCEDURE — ? XTRAC LASER

## 2019-09-23 ASSESSMENT — LOCATION DETAILED DESCRIPTION DERM
LOCATION DETAILED: RIGHT SUPERIOR PARIETAL SCALP
LOCATION DETAILED: RIGHT SUPERIOR PARIETAL SCALP

## 2019-09-23 ASSESSMENT — LOCATION SIMPLE DESCRIPTION DERM
LOCATION SIMPLE: SCALP
LOCATION SIMPLE: SCALP

## 2019-09-23 ASSESSMENT — LOCATION ZONE DERM
LOCATION ZONE: SCALP
LOCATION ZONE: SCALP

## 2019-09-23 NOTE — PROCEDURE: XTRAC LASER
Dose Setting #2 (Mj/Cm2): 1040
Patient Id: 
Location #2: Right Elbow
Total Square Area In Cm2 (Required For Proper Billing): 140
Comments: Treated today by MA
Consent: Written consent obtained, risks reviewed including but not limited to crusting, scabbing, blistering, scarring, darker or lighter pigmentary change, incidental hair removal, bruising, and/or incomplete removal.
Treatment Number: 38
Post-Care Instructions: I reviewed with the patient in detail post-care instructions. Patient should stay away from the sun and wear sun protection until treated areas are fully healed.
Detail Level: Simple
Total Energy In Joules: 234.60
Dose Setting #1 (Mj/Cm2): 3667
Location #1: Scalp
% Increase/Decrease From Last Treatment: increased 10% to both areas
Total Pulses (Will Not Render If 0): 0

## 2019-09-23 NOTE — PROCEDURE: XTRAC LASER
Dose Setting #1 (Mj/Cm2): 6920
Total Square Area In Cm2 (Required For Proper Billing): 140
Total Pulses (Will Not Render If 0): 0
Patient Id: 
Consent: Written consent obtained, risks reviewed including but not limited to crusting, scabbing, blistering, scarring, darker or lighter pigmentary change, incidental hair removal, bruising, and/or incomplete removal.
Treatment Number: 38
Comments: Treated today by MA
Post-Care Instructions: I reviewed with the patient in detail post-care instructions. Patient should stay away from the sun and wear sun protection until treated areas are fully healed.
Location #2: Right Elbow
Detail Level: Simple
Location #1: Scalp
Dose Setting #2 (Mj/Cm2): 1048
Total Energy In Joules: 234.60
% Increase/Decrease From Last Treatment: increased 10% to both areas

## 2019-09-30 ENCOUNTER — APPOINTMENT (RX ONLY)
Dept: URBAN - METROPOLITAN AREA CLINIC 70 | Facility: CLINIC | Age: 29
Setting detail: DERMATOLOGY
End: 2019-09-30

## 2019-09-30 ENCOUNTER — APPOINTMENT (RX ONLY)
Dept: URBAN - METROPOLITAN AREA CLINIC 71 | Facility: CLINIC | Age: 29
Setting detail: DERMATOLOGY
End: 2019-09-30

## 2019-09-30 DIAGNOSIS — L40.0 PSORIASIS VULGARIS: ICD-10-CM

## 2019-09-30 PROBLEM — D48.5 NEOPLASM OF UNCERTAIN BEHAVIOR OF SKIN: Status: ACTIVE | Noted: 2019-09-30

## 2019-09-30 PROCEDURE — ? XTRAC LASER

## 2019-09-30 PROCEDURE — 96920 EXCIMER LSR PSRIASIS<250SQCM: CPT

## 2019-09-30 PROCEDURE — 99211 OFF/OP EST MAY X REQ PHY/QHP: CPT | Mod: 25

## 2019-09-30 ASSESSMENT — LOCATION SIMPLE DESCRIPTION DERM
LOCATION SIMPLE: SCALP
LOCATION SIMPLE: SCALP

## 2019-09-30 ASSESSMENT — LOCATION ZONE DERM
LOCATION ZONE: SCALP
LOCATION ZONE: SCALP

## 2019-09-30 ASSESSMENT — LOCATION DETAILED DESCRIPTION DERM
LOCATION DETAILED: RIGHT SUPERIOR PARIETAL SCALP
LOCATION DETAILED: RIGHT SUPERIOR PARIETAL SCALP

## 2019-09-30 NOTE — PROCEDURE: XTRAC LASER
Location #2: Right Elbow
Dose Setting #1 (Mj/Cm2): 4190
Total Energy In Joules: 151.78
Treatment Number: 39
Dose Setting #2 (Mj/Cm2): 1041
Patient Id: 
Detail Level: Simple
Location #1: Scalp
Total Pulses (Will Not Render If 0): 0
Total Square Area In Cm2 (Required For Proper Billing): 84
Comments: Treated today by MA
% Increase/Decrease From Last Treatment: increased by 5% on scalp repeated dosing on elbow
Consent: Written consent obtained, risks reviewed including but not limited to crusting, scabbing, blistering, scarring, darker or lighter pigmentary change, incidental hair removal, bruising, and/or incomplete removal.
Post-Care Instructions: I reviewed with the patient in detail post-care instructions. Patient should stay away from the sun and wear sun protection until treated areas are fully healed.

## 2019-09-30 NOTE — PROCEDURE: XTRAC LASER
Treatment Number: 39
Comments: Treated today by MA
Total Square Area In Cm2 (Required For Proper Billing): 84
Consent: Written consent obtained, risks reviewed including but not limited to crusting, scabbing, blistering, scarring, darker or lighter pigmentary change, incidental hair removal, bruising, and/or incomplete removal.
Total Energy In Joules: 151.78
Patient Id: 
Location #2: Right Elbow
Post-Care Instructions: I reviewed with the patient in detail post-care instructions. Patient should stay away from the sun and wear sun protection until treated areas are fully healed.
Detail Level: Simple
Dose Setting #2 (Mj/Cm2): 1042
Total Pulses (Will Not Render If 0): 0
Location #1: Scalp
% Increase/Decrease From Last Treatment: increased by 5% on scalp repeated dosing on elbow
Dose Setting #1 (Mj/Cm2): 7558

## 2019-10-04 ENCOUNTER — APPOINTMENT (RX ONLY)
Dept: URBAN - METROPOLITAN AREA CLINIC 70 | Facility: CLINIC | Age: 29
Setting detail: DERMATOLOGY
End: 2019-10-04

## 2019-10-04 ENCOUNTER — APPOINTMENT (RX ONLY)
Dept: URBAN - METROPOLITAN AREA CLINIC 71 | Facility: CLINIC | Age: 29
Setting detail: DERMATOLOGY
End: 2019-10-04

## 2019-10-04 DIAGNOSIS — L40.0 PSORIASIS VULGARIS: ICD-10-CM

## 2019-10-04 PROBLEM — L29.8 OTHER PRURITUS: Status: ACTIVE | Noted: 2019-10-04

## 2019-10-04 PROBLEM — D48.5 NEOPLASM OF UNCERTAIN BEHAVIOR OF SKIN: Status: ACTIVE | Noted: 2019-10-04

## 2019-10-04 PROCEDURE — ? XTRAC LASER

## 2019-10-04 PROCEDURE — 99211 OFF/OP EST MAY X REQ PHY/QHP: CPT | Mod: 25

## 2019-10-04 PROCEDURE — 96920 EXCIMER LSR PSRIASIS<250SQCM: CPT

## 2019-10-04 ASSESSMENT — LOCATION SIMPLE DESCRIPTION DERM
LOCATION SIMPLE: SCALP
LOCATION SIMPLE: SCALP

## 2019-10-04 ASSESSMENT — LOCATION ZONE DERM
LOCATION ZONE: SCALP
LOCATION ZONE: SCALP

## 2019-10-04 ASSESSMENT — LOCATION DETAILED DESCRIPTION DERM
LOCATION DETAILED: RIGHT SUPERIOR PARIETAL SCALP
LOCATION DETAILED: RIGHT SUPERIOR PARIETAL SCALP

## 2019-10-04 NOTE — PROCEDURE: XTRAC LASER
Total Square Area In Cm2 (Required For Proper Billing): 96
Total Energy In Joules: 179.24
Location #1: Scalp
Total Pulses (Will Not Render If 0): 0
Post-Care Instructions: I reviewed with the patient in detail post-care instructions. Patient should stay away from the sun and wear sun protection until treated areas are fully healed.
Detail Level: Simple
% Increase/Decrease From Last Treatment: increased by 5% on scalp and elbow
Comments: Treated today by Te
Patient Id: 
Treatment Number: 40
Dose Setting #2 (Mj/Cm2): 5214
Dose Setting #1 (Mj/Cm2): 0939
Consent: Written consent obtained, risks reviewed including but not limited to crusting, scabbing, blistering, scarring, darker or lighter pigmentary change, incidental hair removal, bruising, and/or incomplete removal.
Location #2: Right Elbow

## 2019-10-04 NOTE — PROCEDURE: XTRAC LASER
Patient Id: 
Dose Setting #1 (Mj/Cm2): 0094
Treatment Number: 40
Consent: Written consent obtained, risks reviewed including but not limited to crusting, scabbing, blistering, scarring, darker or lighter pigmentary change, incidental hair removal, bruising, and/or incomplete removal.
Location #1: Scalp
Post-Care Instructions: I reviewed with the patient in detail post-care instructions. Patient should stay away from the sun and wear sun protection until treated areas are fully healed.
Location #2: Right Elbow
Total Pulses (Will Not Render If 0): 0
Comments: Treated today by Te
Total Energy In Joules: 179.24
Total Square Area In Cm2 (Required For Proper Billing): 96
Dose Setting #2 (Mj/Cm2): 4833
% Increase/Decrease From Last Treatment: increased by 5% on scalp and elbow
Detail Level: Simple

## 2021-07-11 NOTE — PROCEDURE: XTRAC LASER
Location #2: r elbow
Consent: Written consent obtained, risks reviewed including but not limited to crusting, scabbing, blistering, scarring, darker or lighter pigmentary change, incidental hair removal, bruising, and/or incomplete removal.
Total Pulses (Will Not Render If 0): 0
Dose Setting #1 (Mj/Cm2): 9235
Post-Care Instructions: I reviewed with the patient in detail post-care instructions. Patient should stay away from the sun and wear sun protection until treated areas are fully healed.
Dose Setting #2 (Mj/Cm2): did not treat today
Patient Id: 
Total Square Area In Cm2 (Required For Proper Billing): 168
Treatment Number: 35
Comments: Decreased today by 50% due to missing treatments. Ma
Total Energy In Joules: 116
Location #1: scalp
Detail Level: Simple
% Increase/Decrease From Last Treatment: increased 15% on scalp. did not treat elbow due to redness.
as evidenced by Central cord syndrome

## 2021-08-31 NOTE — PROCEDURE: XTRAC LASER
Detail Level: Simple
Location #1: scalp
Location #2: right elbow
Total Energy In Joules: 159.05
Treatment Number: 21
Comments: Increased dose on scalp by 15% and repeated dosage on elbow per her request.
% Increase/Decrease From Last Treatment: increased 15% on scalp and maintained on the elbow
Total Pulses (Will Not Render If 0): 0
Patient Id: 
Dose Setting #1 (Mj/Cm2): 8206
Total Square Area In Cm2 (Required For Proper Billing): 136
Post-Care Instructions: I reviewed with the patient in detail post-care instructions. Patient should stay away from the sun and wear sun protection until treated areas are fully healed.
Consent: Written consent obtained, risks reviewed including but not limited to crusting, scabbing, blistering, scarring, darker or lighter pigmentary change, incidental hair removal, bruising, and/or incomplete removal.
Dose Setting #2 (Mj/Cm2): 1021
31-Aug-2021

## 2023-03-26 ENCOUNTER — OFFICE VISIT (OUTPATIENT)
Dept: FAMILY MEDICINE CLINIC | Facility: CLINIC | Age: 33
End: 2023-03-26
Payer: COMMERCIAL

## 2023-03-26 VITALS
OXYGEN SATURATION: 98 % | WEIGHT: 154 LBS | HEIGHT: 66 IN | DIASTOLIC BLOOD PRESSURE: 82 MMHG | RESPIRATION RATE: 18 BRPM | BODY MASS INDEX: 24.75 KG/M2 | SYSTOLIC BLOOD PRESSURE: 122 MMHG | HEART RATE: 87 BPM | TEMPERATURE: 98 F

## 2023-03-26 DIAGNOSIS — S05.01XA ABRASION OF RIGHT CORNEA, INITIAL ENCOUNTER: Primary | ICD-10-CM

## 2023-03-26 PROCEDURE — 3079F DIAST BP 80-89 MM HG: CPT | Performed by: PHYSICIAN ASSISTANT

## 2023-03-26 PROCEDURE — 3074F SYST BP LT 130 MM HG: CPT | Performed by: PHYSICIAN ASSISTANT

## 2023-03-26 PROCEDURE — 99202 OFFICE O/P NEW SF 15 MIN: CPT | Performed by: PHYSICIAN ASSISTANT

## 2023-03-26 PROCEDURE — 3008F BODY MASS INDEX DOCD: CPT | Performed by: PHYSICIAN ASSISTANT

## 2023-03-26 RX ORDER — ERYTHROMYCIN 5 MG/G
OINTMENT OPHTHALMIC
Qty: 1 G | Refills: 0 | Status: SHIPPED | OUTPATIENT
Start: 2023-03-26

## 2023-07-18 ENCOUNTER — OFFICE VISIT (OUTPATIENT)
Dept: FAMILY MEDICINE CLINIC | Facility: CLINIC | Age: 33
End: 2023-07-18
Payer: COMMERCIAL

## 2023-07-18 VITALS
DIASTOLIC BLOOD PRESSURE: 60 MMHG | HEART RATE: 71 BPM | SYSTOLIC BLOOD PRESSURE: 92 MMHG | BODY MASS INDEX: 24.33 KG/M2 | TEMPERATURE: 98 F | RESPIRATION RATE: 16 BRPM | OXYGEN SATURATION: 99 % | HEIGHT: 67 IN | WEIGHT: 155 LBS

## 2023-07-18 DIAGNOSIS — Z86.2 HISTORY OF ANEMIA: ICD-10-CM

## 2023-07-18 DIAGNOSIS — Z00.00 ROUTINE HEALTH MAINTENANCE: Primary | ICD-10-CM

## 2023-07-18 DIAGNOSIS — Z23 ENCOUNTER FOR IMMUNIZATION: ICD-10-CM

## 2023-07-18 PROCEDURE — 3078F DIAST BP <80 MM HG: CPT | Performed by: NURSE PRACTITIONER

## 2023-07-18 PROCEDURE — 3074F SYST BP LT 130 MM HG: CPT | Performed by: NURSE PRACTITIONER

## 2023-07-18 PROCEDURE — 3008F BODY MASS INDEX DOCD: CPT | Performed by: NURSE PRACTITIONER

## 2023-07-18 PROCEDURE — 99385 PREV VISIT NEW AGE 18-39: CPT | Performed by: NURSE PRACTITIONER

## 2023-07-21 ENCOUNTER — NURSE ONLY (OUTPATIENT)
Dept: FAMILY MEDICINE CLINIC | Facility: CLINIC | Age: 33
End: 2023-07-21
Payer: COMMERCIAL

## 2023-07-21 DIAGNOSIS — Z00.00 ROUTINE HEALTH MAINTENANCE: ICD-10-CM

## 2023-07-21 DIAGNOSIS — Z86.2 HISTORY OF ANEMIA: ICD-10-CM

## 2023-07-21 LAB
ALBUMIN SERPL-MCNC: 4 G/DL (ref 3.4–5)
ALBUMIN/GLOB SERPL: 1.1 {RATIO} (ref 1–2)
ALP LIVER SERPL-CCNC: 72 U/L
ALT SERPL-CCNC: 19 U/L
ANION GAP SERPL CALC-SCNC: 4 MMOL/L (ref 0–18)
AST SERPL-CCNC: 12 U/L (ref 15–37)
BASOPHILS # BLD AUTO: 0.04 X10(3) UL (ref 0–0.2)
BASOPHILS NFR BLD AUTO: 0.4 %
BILIRUB SERPL-MCNC: 0.4 MG/DL (ref 0.1–2)
BUN BLD-MCNC: 12 MG/DL (ref 7–18)
CALCIUM BLD-MCNC: 9.1 MG/DL (ref 8.5–10.1)
CHLORIDE SERPL-SCNC: 107 MMOL/L (ref 98–112)
CHOLEST SERPL-MCNC: 171 MG/DL (ref ?–200)
CO2 SERPL-SCNC: 27 MMOL/L (ref 21–32)
CREAT BLD-MCNC: 0.79 MG/DL
DEPRECATED HBV CORE AB SER IA-ACNC: 34.9 NG/ML
EGFRCR SERPLBLD CKD-EPI 2021: 101 ML/MIN/1.73M2 (ref 60–?)
EOSINOPHIL # BLD AUTO: 0.16 X10(3) UL (ref 0–0.7)
EOSINOPHIL NFR BLD AUTO: 1.7 %
ERYTHROCYTE [DISTWIDTH] IN BLOOD BY AUTOMATED COUNT: 12.7 %
FASTING PATIENT LIPID ANSWER: YES
FASTING STATUS PATIENT QL REPORTED: YES
GLOBULIN PLAS-MCNC: 3.8 G/DL (ref 2.8–4.4)
GLUCOSE BLD-MCNC: 91 MG/DL (ref 70–99)
HCT VFR BLD AUTO: 39 %
HDLC SERPL-MCNC: 81 MG/DL (ref 40–59)
HGB BLD-MCNC: 12.2 G/DL
IMM GRANULOCYTES # BLD AUTO: 0.03 X10(3) UL (ref 0–1)
IMM GRANULOCYTES NFR BLD: 0.3 %
IRON SATN MFR SERPL: 22 %
IRON SERPL-MCNC: 77 UG/DL
LDLC SERPL CALC-MCNC: 81 MG/DL (ref ?–100)
LYMPHOCYTES # BLD AUTO: 2.25 X10(3) UL (ref 1–4)
LYMPHOCYTES NFR BLD AUTO: 24.2 %
MCH RBC QN AUTO: 28.6 PG (ref 26–34)
MCHC RBC AUTO-ENTMCNC: 31.3 G/DL (ref 31–37)
MCV RBC AUTO: 91.5 FL
MONOCYTES # BLD AUTO: 0.48 X10(3) UL (ref 0.1–1)
MONOCYTES NFR BLD AUTO: 5.2 %
NEUTROPHILS # BLD AUTO: 6.33 X10 (3) UL (ref 1.5–7.7)
NEUTROPHILS # BLD AUTO: 6.33 X10(3) UL (ref 1.5–7.7)
NEUTROPHILS NFR BLD AUTO: 68.2 %
NONHDLC SERPL-MCNC: 90 MG/DL (ref ?–130)
OSMOLALITY SERPL CALC.SUM OF ELEC: 285 MOSM/KG (ref 275–295)
PLATELET # BLD AUTO: 368 10(3)UL (ref 150–450)
POTASSIUM SERPL-SCNC: 4.2 MMOL/L (ref 3.5–5.1)
PROT SERPL-MCNC: 7.8 G/DL (ref 6.4–8.2)
RBC # BLD AUTO: 4.26 X10(6)UL
SODIUM SERPL-SCNC: 138 MMOL/L (ref 136–145)
TIBC SERPL-MCNC: 344 UG/DL (ref 240–450)
TRANSFERRIN SERPL-MCNC: 231 MG/DL (ref 200–360)
TRIGL SERPL-MCNC: 39 MG/DL (ref 30–149)
TSI SER-ACNC: 1.04 MIU/ML (ref 0.36–3.74)
VLDLC SERPL CALC-MCNC: 6 MG/DL (ref 0–30)
WBC # BLD AUTO: 9.3 X10(3) UL (ref 4–11)

## 2023-07-21 PROCEDURE — 80053 COMPREHEN METABOLIC PANEL: CPT | Performed by: NURSE PRACTITIONER

## 2023-07-21 PROCEDURE — 80061 LIPID PANEL: CPT | Performed by: NURSE PRACTITIONER

## 2023-07-21 PROCEDURE — 83550 IRON BINDING TEST: CPT | Performed by: NURSE PRACTITIONER

## 2023-07-21 PROCEDURE — 85025 COMPLETE CBC W/AUTO DIFF WBC: CPT | Performed by: NURSE PRACTITIONER

## 2023-07-21 PROCEDURE — 82728 ASSAY OF FERRITIN: CPT | Performed by: NURSE PRACTITIONER

## 2023-07-21 PROCEDURE — 83540 ASSAY OF IRON: CPT | Performed by: NURSE PRACTITIONER

## 2023-07-21 PROCEDURE — 84443 ASSAY THYROID STIM HORMONE: CPT | Performed by: NURSE PRACTITIONER

## 2023-07-21 NOTE — PROGRESS NOTES
Viky Brown present in office for nurse visit. Labs as ordered by Fartun Stephens; 22 g, Right AC, lavender tube, and green tube     All questions/concerns addressed. Patient left in stable condition.

## 2023-07-24 ENCOUNTER — TELEPHONE (OUTPATIENT)
Dept: FAMILY MEDICINE CLINIC | Facility: CLINIC | Age: 33
End: 2023-07-24

## 2023-07-24 NOTE — TELEPHONE ENCOUNTER
----- Message from ROHINI Velazquez sent at 7/23/2023  7:23 PM CDT -----  CMP - blood sugar, electrolytes, kidney function, liver enzymes and protein levels are stable - AST is flagged as abnormal, but typically not of concern - consider a daily women's multivitamin if not already taking one    All other labs are normal:    TSH - thyroid is functioning normally  IRON & IRON BINDING - normal  FERRITIN - normal level  LIPID - cholesterol levels look healthy, HDL (\"good cholesterol\") of 81 which is awesome  CBC - blood counts are all stable

## 2023-07-28 NOTE — TELEPHONE ENCOUNTER
Patient's name and  verified   Patient is taking a Prenatal vitamin daily but forgot it the morning of lab work. Any suggestions? Do you want her to switch?    Patient notified and verbalized an understanding

## 2023-11-02 ENCOUNTER — MED REC SCAN ONLY (OUTPATIENT)
Dept: FAMILY MEDICINE CLINIC | Facility: CLINIC | Age: 33
End: 2023-11-02

## 2024-11-01 ENCOUNTER — OFFICE VISIT (OUTPATIENT)
Dept: FAMILY MEDICINE CLINIC | Facility: CLINIC | Age: 34
End: 2024-11-01
Payer: COMMERCIAL

## 2024-11-01 VITALS
OXYGEN SATURATION: 99 % | HEART RATE: 77 BPM | RESPIRATION RATE: 16 BRPM | SYSTOLIC BLOOD PRESSURE: 100 MMHG | HEIGHT: 67 IN | BODY MASS INDEX: 24.48 KG/M2 | DIASTOLIC BLOOD PRESSURE: 60 MMHG | WEIGHT: 156 LBS | TEMPERATURE: 97 F

## 2024-11-01 DIAGNOSIS — Z12.4 SCREENING FOR CERVICAL CANCER: ICD-10-CM

## 2024-11-01 DIAGNOSIS — Z00.00 ANNUAL PHYSICAL EXAM: Primary | ICD-10-CM

## 2024-11-01 DIAGNOSIS — Z23 ENCOUNTER FOR IMMUNIZATION: ICD-10-CM

## 2024-11-01 DIAGNOSIS — Z86.2 HISTORY OF ANEMIA: ICD-10-CM

## 2024-11-01 LAB
ALBUMIN SERPL-MCNC: 4.7 G/DL (ref 3.2–4.8)
ALBUMIN/GLOB SERPL: 1.5 {RATIO} (ref 1–2)
ALP LIVER SERPL-CCNC: 60 U/L
ALT SERPL-CCNC: 10 U/L
ANION GAP SERPL CALC-SCNC: 3 MMOL/L (ref 0–18)
AST SERPL-CCNC: 13 U/L (ref ?–34)
BASOPHILS # BLD AUTO: 0.05 X10(3) UL (ref 0–0.2)
BASOPHILS NFR BLD AUTO: 0.7 %
BILIRUB SERPL-MCNC: 0.6 MG/DL (ref 0.3–1.2)
BUN BLD-MCNC: 11 MG/DL (ref 9–23)
CALCIUM BLD-MCNC: 10.2 MG/DL (ref 8.7–10.4)
CHLORIDE SERPL-SCNC: 106 MMOL/L (ref 98–112)
CHOLEST SERPL-MCNC: 155 MG/DL (ref ?–200)
CO2 SERPL-SCNC: 29 MMOL/L (ref 21–32)
CREAT BLD-MCNC: 0.8 MG/DL
DEPRECATED HBV CORE AB SER IA-ACNC: 41 NG/ML
EGFRCR SERPLBLD CKD-EPI 2021: 99 ML/MIN/1.73M2 (ref 60–?)
EOSINOPHIL # BLD AUTO: 0.09 X10(3) UL (ref 0–0.7)
EOSINOPHIL NFR BLD AUTO: 1.2 %
ERYTHROCYTE [DISTWIDTH] IN BLOOD BY AUTOMATED COUNT: 12.2 %
FASTING PATIENT LIPID ANSWER: YES
FASTING STATUS PATIENT QL REPORTED: YES
GLOBULIN PLAS-MCNC: 3.2 G/DL (ref 2–3.5)
GLUCOSE BLD-MCNC: 92 MG/DL (ref 70–99)
HCT VFR BLD AUTO: 38.2 %
HDLC SERPL-MCNC: 64 MG/DL (ref 40–59)
HGB BLD-MCNC: 12.5 G/DL
IMM GRANULOCYTES # BLD AUTO: 0.02 X10(3) UL (ref 0–1)
IMM GRANULOCYTES NFR BLD: 0.3 %
IRON SATN MFR SERPL: 30 %
IRON SERPL-MCNC: 84 UG/DL
LDLC SERPL CALC-MCNC: 79 MG/DL (ref ?–100)
LYMPHOCYTES # BLD AUTO: 1.96 X10(3) UL (ref 1–4)
LYMPHOCYTES NFR BLD AUTO: 26.4 %
MCH RBC QN AUTO: 29.3 PG (ref 26–34)
MCHC RBC AUTO-ENTMCNC: 32.7 G/DL (ref 31–37)
MCV RBC AUTO: 89.7 FL
MONOCYTES # BLD AUTO: 0.46 X10(3) UL (ref 0.1–1)
MONOCYTES NFR BLD AUTO: 6.2 %
NEUTROPHILS # BLD AUTO: 4.84 X10 (3) UL (ref 1.5–7.7)
NEUTROPHILS # BLD AUTO: 4.84 X10(3) UL (ref 1.5–7.7)
NEUTROPHILS NFR BLD AUTO: 65.2 %
NONHDLC SERPL-MCNC: 91 MG/DL (ref ?–130)
OSMOLALITY SERPL CALC.SUM OF ELEC: 285 MOSM/KG (ref 275–295)
PLATELET # BLD AUTO: 357 10(3)UL (ref 150–450)
POTASSIUM SERPL-SCNC: 4.3 MMOL/L (ref 3.5–5.1)
PROT SERPL-MCNC: 7.9 G/DL (ref 5.7–8.2)
RBC # BLD AUTO: 4.26 X10(6)UL
SODIUM SERPL-SCNC: 138 MMOL/L (ref 136–145)
TOTAL IRON BINDING CAPACITY: 278 UG/DL (ref 250–425)
TRANSFERRIN SERPL-MCNC: 228 MG/DL (ref 250–380)
TRIGL SERPL-MCNC: 61 MG/DL (ref 30–149)
TSI SER-ACNC: 1.15 UIU/ML (ref 0.55–4.78)
VLDLC SERPL CALC-MCNC: 9 MG/DL (ref 0–30)
WBC # BLD AUTO: 7.4 X10(3) UL (ref 4–11)

## 2024-11-01 PROCEDURE — 82728 ASSAY OF FERRITIN: CPT | Performed by: NURSE PRACTITIONER

## 2024-11-01 PROCEDURE — 87624 HPV HI-RISK TYP POOLED RSLT: CPT | Performed by: NURSE PRACTITIONER

## 2024-11-01 PROCEDURE — 84443 ASSAY THYROID STIM HORMONE: CPT | Performed by: NURSE PRACTITIONER

## 2024-11-01 PROCEDURE — 99395 PREV VISIT EST AGE 18-39: CPT | Performed by: NURSE PRACTITIONER

## 2024-11-01 PROCEDURE — 90471 IMMUNIZATION ADMIN: CPT | Performed by: NURSE PRACTITIONER

## 2024-11-01 PROCEDURE — 83540 ASSAY OF IRON: CPT | Performed by: NURSE PRACTITIONER

## 2024-11-01 PROCEDURE — 88175 CYTOPATH C/V AUTO FLUID REDO: CPT | Performed by: NURSE PRACTITIONER

## 2024-11-01 PROCEDURE — 90656 IIV3 VACC NO PRSV 0.5 ML IM: CPT | Performed by: NURSE PRACTITIONER

## 2024-11-01 PROCEDURE — 85025 COMPLETE CBC W/AUTO DIFF WBC: CPT | Performed by: NURSE PRACTITIONER

## 2024-11-01 PROCEDURE — 80053 COMPREHEN METABOLIC PANEL: CPT | Performed by: NURSE PRACTITIONER

## 2024-11-01 PROCEDURE — 80061 LIPID PANEL: CPT | Performed by: NURSE PRACTITIONER

## 2024-11-01 PROCEDURE — 83550 IRON BINDING TEST: CPT | Performed by: NURSE PRACTITIONER

## 2024-11-01 NOTE — PROGRESS NOTES
CHIEF COMPLAINT:    Chief Complaint   Patient presents with    Physical     And pap and flu vaccine       HISTORY OF PRESENT ILLNESS:    Ana Velez is a 34 year old female who has a history of anemia and family history of hereditary hemachromatosis who presents today, 2024, for an annual physical.    - NUTRITION:  Follows a regular diet.  Drinks approximately 48-64oz of water a day.   - SLEEP:  Sleeps throughout the night.  Wakes up feeling rested.   - SAFETY:  Reports feeling safe at home.  Denies concerns regarding safety.   - PHYSICAL ACTIVITY/SOCIAL/HOBBIES:  , mother of two children.  Achieves 2.5hr of exercise a week via walking and occasional pilates/yoga.  Working part time, cleaning company.   - GOAL:  Ana would like to improve health overall by improving exercise routine.    IMMUNIZATIONS:  Influenza    There is no problem list on file for this patient.    ALLERGIES:  Allergies[1]    CURRENT MEDICATIONS:  No current outpatient medications on file.       MEDICAL HISTORY:  History reviewed. No pertinent past medical history.  Past Surgical History:   Procedure Laterality Date      19 and 07/10/22     Family History   Problem Relation Age of Onset    Bleeding Disorders Mother         Hereditary Hemochromatosis    Bleeding Disorders Maternal Grandmother         Hereditary Hemochromatosis, [id number] mutations, hfe     Family Status   Relation Status    Mo (Not Specified)    MGMA (Not Specified)     Social History     Socioeconomic History    Marital status:    Tobacco Use    Smoking status: Never    Smokeless tobacco: Never   Vaping Use    Vaping status: Never Used   Substance and Sexual Activity    Alcohol use: Yes     Alcohol/week: 2.0 standard drinks of alcohol     Types: 2 Cans of beer per week     Comment: social    Drug use: Never   Other Topics Concern    Caffeine Concern No    Exercise No    Seat Belt Yes    Special Diet No    Stress Concern No    Weight  Concern No     Social Drivers of Health     Financial Resource Strain: Not At Risk (7/9/2022)    [facility]    Financial Resource Strain     How hard is it for you to pay for the very basics like food, housing, medical care, and heating?: Not hard at all   Food Insecurity: No Food Insecurity (7/9/2022)    [facility]    Food Insecurity     Currently or in the past 3 months, have you worried your food would run out before you had money to buy more?: No     In the past 12 months, have you run out of food or been unable to get more?: No   Transportation Needs: No Transportation Needs (7/9/2022)    [facility]    Transportation Needs     Medical Transportation Needs?: Patient refused     Daily Living Transportation Needs? [Peds Only] : Patient refused    [facility]    Housing Stability       ROS:    GENERAL:  Denies fever or chills  RESPIRATORY:  Denies difficulty breathing  CARDIAC:  Denies chest pain with exertion  GI:  Denies nausea, vomiting, diarrhea, constipation, or blood in stool  :  Denies blood in urine or painful urination  REPRO:  Denies vaginal discharge or pelvic pain  NEURO:  Denies recent falls   MSKL:  Denies joint stiffness or pain  SKIN:  Denies change in texture of moles, sees dermatology, declines need for referral today  PSYCH: Denies thoughts of self harm or harming others     VITALS:    /60   Pulse 77   Temp 97.3 °F (36.3 °C) (Temporal)   Resp 16   Ht 5' 7\" (1.702 m)   Wt 156 lb (70.8 kg)   LMP 10/05/2024   SpO2 99%   Breastfeeding No   BMI 24.43 kg/m²     Ideal body weight: 61.6 kg (135 lb 12.9 oz)  Adjusted ideal body weight: 65.3 kg (143 lb 14.1 oz)  Wt Readings from Last 3 Encounters:   11/01/24 156 lb (70.8 kg)    07/18/23 155 lb (70.3 kg)   03/26/23 154 lb (69.9 kg)     BP Readings from Last 3 Encounters:   11/01/24 100/60   07/18/23 92/60   03/26/23 122/82     Reviewed by Evette Ruiz MS, APRN, FNP-BC    PHYSICAL EXAM:    Constitutional:       Appearance: Normal appearance.  Sitting upright on exam table.  Well developed, well nourished, and in no acute distress.  HENT:      Head: Facial features symmetric. Normocephalic and atraumatic.      Right Ear: Canal clear without erythema or drainage.  TM clear and intact, neutral in position.      Left Ear: Canal clear without erythema or drainage.  TM clear and intact, neutral in position.      Nose: Nose normal.      Mouth/Throat: Mucous membranes are moist.  Uvula rises midline.  Eyes:      Extraocular Movements: Extraocular movements intact.      Conjunctiva/sclera: Conjunctivae normal. Sclera anicteric         Pupils: Pupils are equal, round, and reactive to light.   Neck:     Neck is supple. Trachea is midline.  No masses.      No obvious lympadenopathy with palpation of submandibular, pre/posterior auricular, anterior/posterior cervical, occipital, and supraclavicular nodes.  Cardiovascular:      Heart sounds: Regular rate and rhythm without murmur.     BLE without edema.  Pulmonary:      Effort: Pulmonary effort is normal.      Breath sounds: Lungs clear throughout.     No cough or wheezing.  Abdominal:      General: Abdomen is nondistended, bowel sounds normoactive, soft, nontender.  No organomegaly.  Musculoskeletal:         General: Normal range of motion. Strength of extremities are equal bilaterally.     Range of motion without limitations.  Skin:     General: Skin is warm and dry.      Coloration: Skin is without jaundice     Findings: No bruising or rashes  Neurological:      General: No focal deficit present. Speech is clear and organized.     Mental Status: Alert and oriented to person, place, and time.      Sensory: Sensation is intact.      Motor: Motor  function is intact. Movements are smooth and controlled without ataxia.     Coordination: Coordination is intact. Coordination normal.      Gait: Gait steady and nonantalgic.      Deep Tendon Reflexes: Reflexes 2+ bilaterally.  Psychiatric:         Mood and Affect: Mood normal.         Behavior: Behavior normal.         Thought Content: Thought content normal.         Judgment: Judgment normal.     PELVIC EXAM:  External genitalia without masses, lesions, erythema, or tenderness.  Mucosa is pink in color, moist, and without signs of inflammation or irritation.   Discharge is thick white with some brown, without clumping/adhering to vaginal walls.   Cervix/os pink without lesions or discoloration.    No motion tenderness. No pelvic or abdominal fullness/tenderness.   Pap test sample collected using brush and broom.  Scant amount of bleeding.  Clinical chaperone present during exam.    ASSESSMENT & PLAN:  Plan on follow-up in 1 year or earlier if needed  Refer to result notes for further information    1. Annual physical exam  - VENIPUNCTURE  - Lipid Panel; Future  - TSH W Reflex To Free T4; Future  - CBC With Differential With Platelet; Future  - Comp Metabolic Panel (14); Future  - Ferritin; Future  - Iron And Tibc; Future  - Lipid Panel  - TSH W Reflex To Free T4  - CBC With Differential With Platelet  - Comp Metabolic Panel (14)  - Ferritin  - Iron And Tibc    2. Encounter for immunization  - Fluzone trivalent vaccine, PF 0.5mL, 6mo+ (18571)    3. Screening for cervical cancer  - ThinPrep PAP Smear; Future  - Hpv Dna  High Risk , Thin Prep Collect; Future    4. History of anemia  Per patient's request:  - Ferritin; Future  - Iron And Tibc; Future  - Ferritin  - Iron And Tibc         [1]   Allergies  Allergen Reactions    Penicillins RASH

## 2024-11-04 LAB — HPV E6+E7 MRNA CVX QL NAA+PROBE: NEGATIVE

## 2024-11-06 LAB
.: NORMAL
.: NORMAL